# Patient Record
Sex: MALE | Race: BLACK OR AFRICAN AMERICAN | Employment: OTHER | URBAN - METROPOLITAN AREA
[De-identification: names, ages, dates, MRNs, and addresses within clinical notes are randomized per-mention and may not be internally consistent; named-entity substitution may affect disease eponyms.]

---

## 2023-02-04 ENCOUNTER — APPOINTMENT (OUTPATIENT)
Dept: GENERAL RADIOLOGY | Age: 73
End: 2023-02-04
Payer: MEDICARE

## 2023-02-04 ENCOUNTER — APPOINTMENT (OUTPATIENT)
Dept: CT IMAGING | Age: 73
End: 2023-02-04
Payer: MEDICARE

## 2023-02-04 ENCOUNTER — HOSPITAL ENCOUNTER (INPATIENT)
Age: 73
LOS: 5 days | Discharge: HOME | End: 2023-02-09
Attending: EMERGENCY MEDICINE | Admitting: PSYCHIATRY & NEUROLOGY
Payer: MEDICARE

## 2023-02-04 DIAGNOSIS — I65.21 ICAO (INTERNAL CAROTID ARTERY OCCLUSION), RIGHT: ICD-10-CM

## 2023-02-04 DIAGNOSIS — R53.1 LEFT-SIDED WEAKNESS: Primary | ICD-10-CM

## 2023-02-04 DIAGNOSIS — I63.50 CEREBROVASCULAR ACCIDENT (CVA) DUE TO STENOSIS OF CEREBRAL ARTERY (HCC): ICD-10-CM

## 2023-02-04 PROBLEM — I63.9 ACUTE ISCHEMIC STROKE (HCC): Status: ACTIVE | Noted: 2023-02-04

## 2023-02-04 LAB
ABSOLUTE EOS #: 0.07 K/UL (ref 0–0.44)
ABSOLUTE IMMATURE GRANULOCYTE: <0.03 K/UL (ref 0–0.3)
ABSOLUTE LYMPH #: 1.29 K/UL (ref 1.1–3.7)
ABSOLUTE MONO #: 0.55 K/UL (ref 0.1–1.2)
ANION GAP SERPL CALCULATED.3IONS-SCNC: 11 MMOL/L (ref 9–17)
ANION GAP: 11 MMOL/L (ref 7–16)
BASOPHILS # BLD: 0 % (ref 0–2)
BASOPHILS ABSOLUTE: <0.03 K/UL (ref 0–0.2)
BUN SERPL-MCNC: 19 MG/DL (ref 8–23)
CALCIUM SERPL-MCNC: 8.9 MG/DL (ref 8.6–10.4)
CHLORIDE SERPL-SCNC: 106 MMOL/L (ref 98–107)
CK SERPL-CCNC: 419 U/L (ref 39–308)
CO2 SERPL-SCNC: 22 MMOL/L (ref 20–31)
CREAT SERPL-MCNC: 1.07 MG/DL (ref 0.7–1.2)
EGFR, POC: >60 ML/MIN/1.73M2
EOSINOPHILS RELATIVE PERCENT: 1 % (ref 1–4)
GFR SERPL CREATININE-BSD FRML MDRD: >60 ML/MIN/1.73M2
GLUCOSE BLD-MCNC: 103 MG/DL (ref 74–100)
GLUCOSE SERPL-MCNC: 104 MG/DL (ref 70–99)
HCO3 VENOUS: 28.1 MMOL/L (ref 22–29)
HCT VFR BLD AUTO: 36.5 % (ref 40.7–50.3)
HGB BLD-MCNC: 12 G/DL (ref 13–17)
IMMATURE GRANULOCYTES: 0 %
INR PPP: 1
LYMPHOCYTES # BLD: 22 % (ref 24–43)
MAGNESIUM SERPL-MCNC: 1.9 MG/DL (ref 1.6–2.6)
MCH RBC QN AUTO: 32 PG (ref 25.2–33.5)
MCHC RBC AUTO-ENTMCNC: 32.9 G/DL (ref 28.4–34.8)
MCV RBC AUTO: 97.3 FL (ref 82.6–102.9)
MONOCYTES # BLD: 10 % (ref 3–12)
MYOGLOBIN SERPL-MCNC: 83 NG/ML (ref 28–72)
NRBC AUTOMATED: 0 PER 100 WBC
O2 SAT, VEN: 44 % (ref 60–85)
PARTIAL THROMBOPLASTIN TIME: 20.3 SEC (ref 20.5–30.5)
PCO2, VEN: 49 MM HG (ref 41–51)
PDW BLD-RTO: 11.6 % (ref 11.8–14.4)
PH VENOUS: 7.37 (ref 7.32–7.43)
PLATELET # BLD AUTO: 216 K/UL (ref 138–453)
PMV BLD AUTO: 10.7 FL (ref 8.1–13.5)
PO2, VEN: 25.8 MM HG (ref 30–50)
POC BUN: 23 MG/DL (ref 8–26)
POC CHLORIDE: 106 MMOL/L (ref 98–107)
POC CREATININE: 1.1 MG/DL (ref 0.51–1.19)
POC HEMATOCRIT: 39 % (ref 41–53)
POC HEMOGLOBIN: 13.2 G/DL (ref 13.5–17.5)
POC IONIZED CALCIUM: 1.23 MMOL/L (ref 1.15–1.33)
POC LACTIC ACID: 0.85 MMOL/L (ref 0.56–1.39)
POC POTASSIUM: 4.5 MMOL/L (ref 3.5–4.5)
POC SODIUM: 143 MMOL/L (ref 138–146)
POC TCO2: 27 MMOL/L (ref 22–30)
POSITIVE BASE EXCESS, VEN: 2 (ref 0–3)
POTASSIUM SERPL-SCNC: 4.6 MMOL/L (ref 3.7–5.3)
PROTHROMBIN TIME: 11.1 SEC (ref 9.1–12.3)
RBC # BLD: 3.75 M/UL (ref 4.21–5.77)
SEG NEUTROPHILS: 67 % (ref 36–65)
SEGMENTED NEUTROPHILS ABSOLUTE COUNT: 3.85 K/UL (ref 1.5–8.1)
SODIUM SERPL-SCNC: 139 MMOL/L (ref 135–144)
TROPONIN I SERPL DL<=0.01 NG/ML-MCNC: 11 NG/L (ref 0–22)
TROPONIN I SERPL DL<=0.01 NG/ML-MCNC: 11 NG/L (ref 0–22)
TROPONIN I SERPL DL<=0.01 NG/ML-MCNC: 14 NG/L (ref 0–22)
WBC # BLD AUTO: 5.8 K/UL (ref 3.5–11.3)

## 2023-02-04 PROCEDURE — 85025 COMPLETE CBC W/AUTO DIFF WBC: CPT

## 2023-02-04 PROCEDURE — 85610 PROTHROMBIN TIME: CPT

## 2023-02-04 PROCEDURE — 2580000003 HC RX 258: Performed by: FAMILY MEDICINE

## 2023-02-04 PROCEDURE — 6370000000 HC RX 637 (ALT 250 FOR IP): Performed by: STUDENT IN AN ORGANIZED HEALTH CARE EDUCATION/TRAINING PROGRAM

## 2023-02-04 PROCEDURE — 80048 BASIC METABOLIC PNL TOTAL CA: CPT

## 2023-02-04 PROCEDURE — 6370000000 HC RX 637 (ALT 250 FOR IP): Performed by: FAMILY MEDICINE

## 2023-02-04 PROCEDURE — 6360000004 HC RX CONTRAST MEDICATION: Performed by: STUDENT IN AN ORGANIZED HEALTH CARE EDUCATION/TRAINING PROGRAM

## 2023-02-04 PROCEDURE — 93005 ELECTROCARDIOGRAM TRACING: CPT | Performed by: STUDENT IN AN ORGANIZED HEALTH CARE EDUCATION/TRAINING PROGRAM

## 2023-02-04 PROCEDURE — 99223 1ST HOSP IP/OBS HIGH 75: CPT | Performed by: PSYCHIATRY & NEUROLOGY

## 2023-02-04 PROCEDURE — 82550 ASSAY OF CK (CPK): CPT

## 2023-02-04 PROCEDURE — 6360000002 HC RX W HCPCS

## 2023-02-04 PROCEDURE — 84520 ASSAY OF UREA NITROGEN: CPT

## 2023-02-04 PROCEDURE — 83605 ASSAY OF LACTIC ACID: CPT

## 2023-02-04 PROCEDURE — 85730 THROMBOPLASTIN TIME PARTIAL: CPT

## 2023-02-04 PROCEDURE — 82947 ASSAY GLUCOSE BLOOD QUANT: CPT

## 2023-02-04 PROCEDURE — 70450 CT HEAD/BRAIN W/O DYE: CPT

## 2023-02-04 PROCEDURE — 6360000002 HC RX W HCPCS: Performed by: FAMILY MEDICINE

## 2023-02-04 PROCEDURE — 71045 X-RAY EXAM CHEST 1 VIEW: CPT

## 2023-02-04 PROCEDURE — 82803 BLOOD GASES ANY COMBINATION: CPT

## 2023-02-04 PROCEDURE — 84484 ASSAY OF TROPONIN QUANT: CPT

## 2023-02-04 PROCEDURE — 80051 ELECTROLYTE PANEL: CPT

## 2023-02-04 PROCEDURE — 93005 ELECTROCARDIOGRAM TRACING: CPT | Performed by: PSYCHIATRY & NEUROLOGY

## 2023-02-04 PROCEDURE — 82553 CREATINE MB FRACTION: CPT

## 2023-02-04 PROCEDURE — 2580000003 HC RX 258: Performed by: PSYCHIATRY & NEUROLOGY

## 2023-02-04 PROCEDURE — 83874 ASSAY OF MYOGLOBIN: CPT

## 2023-02-04 PROCEDURE — 99285 EMERGENCY DEPT VISIT HI MDM: CPT

## 2023-02-04 PROCEDURE — 82565 ASSAY OF CREATININE: CPT

## 2023-02-04 PROCEDURE — 83735 ASSAY OF MAGNESIUM: CPT

## 2023-02-04 PROCEDURE — 85014 HEMATOCRIT: CPT

## 2023-02-04 PROCEDURE — 6360000002 HC RX W HCPCS: Performed by: STUDENT IN AN ORGANIZED HEALTH CARE EDUCATION/TRAINING PROGRAM

## 2023-02-04 PROCEDURE — 82330 ASSAY OF CALCIUM: CPT

## 2023-02-04 PROCEDURE — 6370000000 HC RX 637 (ALT 250 FOR IP): Performed by: PSYCHIATRY & NEUROLOGY

## 2023-02-04 PROCEDURE — 2580000003 HC RX 258: Performed by: STUDENT IN AN ORGANIZED HEALTH CARE EDUCATION/TRAINING PROGRAM

## 2023-02-04 PROCEDURE — 70496 CT ANGIOGRAPHY HEAD: CPT

## 2023-02-04 PROCEDURE — 2000000000 HC ICU R&B

## 2023-02-04 PROCEDURE — 36620 INSERTION CATHETER ARTERY: CPT

## 2023-02-04 RX ORDER — ONDANSETRON 2 MG/ML
4 INJECTION INTRAMUSCULAR; INTRAVENOUS EVERY 6 HOURS PRN
Status: DISCONTINUED | OUTPATIENT
Start: 2023-02-04 | End: 2023-02-09 | Stop reason: HOSPADM

## 2023-02-04 RX ORDER — HEPARIN SODIUM 1000 [USP'U]/ML
2000 INJECTION, SOLUTION INTRAVENOUS; SUBCUTANEOUS ONCE
Status: COMPLETED | OUTPATIENT
Start: 2023-02-04 | End: 2023-02-04

## 2023-02-04 RX ORDER — DOPAMINE HYDROCHLORIDE 160 MG/100ML
1-20 INJECTION, SOLUTION INTRAVENOUS CONTINUOUS
Status: DISCONTINUED | OUTPATIENT
Start: 2023-02-04 | End: 2023-02-06

## 2023-02-04 RX ORDER — POLYETHYLENE GLYCOL 3350 17 G/17G
17 POWDER, FOR SOLUTION ORAL DAILY PRN
Status: DISCONTINUED | OUTPATIENT
Start: 2023-02-04 | End: 2023-02-09 | Stop reason: HOSPADM

## 2023-02-04 RX ORDER — SODIUM CHLORIDE 9 MG/ML
INJECTION, SOLUTION INTRAVENOUS CONTINUOUS
Status: DISCONTINUED | OUTPATIENT
Start: 2023-02-04 | End: 2023-02-08

## 2023-02-04 RX ORDER — ASPIRIN 325 MG
650 TABLET ORAL ONCE
Status: COMPLETED | OUTPATIENT
Start: 2023-02-04 | End: 2023-02-04

## 2023-02-04 RX ORDER — DOPAMINE HYDROCHLORIDE 160 MG/100ML
INJECTION, SOLUTION INTRAVENOUS
Status: COMPLETED
Start: 2023-02-04 | End: 2023-02-04

## 2023-02-04 RX ORDER — SODIUM CHLORIDE 0.9 % (FLUSH) 0.9 %
5-40 SYRINGE (ML) INJECTION PRN
Status: DISCONTINUED | OUTPATIENT
Start: 2023-02-04 | End: 2023-02-09 | Stop reason: HOSPADM

## 2023-02-04 RX ORDER — HEPARIN SODIUM AND DEXTROSE 10000; 5 [USP'U]/100ML; G/100ML
5-30 INJECTION INTRAVENOUS CONTINUOUS
Status: DISCONTINUED | OUTPATIENT
Start: 2023-02-04 | End: 2023-02-04

## 2023-02-04 RX ORDER — ACETAMINOPHEN 650 MG/1
650 SUPPOSITORY RECTAL EVERY 4 HOURS PRN
Status: DISCONTINUED | OUTPATIENT
Start: 2023-02-04 | End: 2023-02-09 | Stop reason: HOSPADM

## 2023-02-04 RX ORDER — MIDODRINE HYDROCHLORIDE 5 MG/1
20 TABLET ORAL 3 TIMES DAILY
Status: DISCONTINUED | OUTPATIENT
Start: 2023-02-04 | End: 2023-02-04

## 2023-02-04 RX ORDER — DOPAMINE HYDROCHLORIDE 160 MG/100ML
1-20 INJECTION, SOLUTION INTRAVENOUS CONTINUOUS
Status: DISCONTINUED | OUTPATIENT
Start: 2023-02-04 | End: 2023-02-04

## 2023-02-04 RX ORDER — MIDODRINE HYDROCHLORIDE 5 MG/1
15 TABLET ORAL
Status: DISCONTINUED | OUTPATIENT
Start: 2023-02-04 | End: 2023-02-04

## 2023-02-04 RX ORDER — ATORVASTATIN CALCIUM 40 MG/1
40 TABLET, FILM COATED ORAL NIGHTLY
Status: DISCONTINUED | OUTPATIENT
Start: 2023-02-04 | End: 2023-02-05

## 2023-02-04 RX ORDER — ACETAMINOPHEN 325 MG/1
650 TABLET ORAL EVERY 4 HOURS PRN
Status: DISCONTINUED | OUTPATIENT
Start: 2023-02-04 | End: 2023-02-09 | Stop reason: HOSPADM

## 2023-02-04 RX ORDER — ONDANSETRON 2 MG/ML
4 INJECTION INTRAMUSCULAR; INTRAVENOUS ONCE
Status: DISCONTINUED | OUTPATIENT
Start: 2023-02-04 | End: 2023-02-04

## 2023-02-04 RX ORDER — MIDODRINE HYDROCHLORIDE 5 MG/1
20 TABLET ORAL ONCE
Status: COMPLETED | OUTPATIENT
Start: 2023-02-04 | End: 2023-02-04

## 2023-02-04 RX ORDER — SODIUM CHLORIDE 9 MG/ML
INJECTION, SOLUTION INTRAVENOUS PRN
Status: DISCONTINUED | OUTPATIENT
Start: 2023-02-04 | End: 2023-02-09 | Stop reason: HOSPADM

## 2023-02-04 RX ORDER — ASPIRIN 81 MG/1
81 TABLET, CHEWABLE ORAL DAILY
Status: DISCONTINUED | OUTPATIENT
Start: 2023-02-05 | End: 2023-02-09 | Stop reason: HOSPADM

## 2023-02-04 RX ORDER — SODIUM CHLORIDE 0.9 % (FLUSH) 0.9 %
10 SYRINGE (ML) INJECTION ONCE
Status: COMPLETED | OUTPATIENT
Start: 2023-02-04 | End: 2023-02-04

## 2023-02-04 RX ORDER — SODIUM CHLORIDE 0.9 % (FLUSH) 0.9 %
10 SYRINGE (ML) INJECTION ONCE
Status: DISCONTINUED | OUTPATIENT
Start: 2023-02-04 | End: 2023-02-04

## 2023-02-04 RX ORDER — ONDANSETRON 4 MG/1
4 TABLET, ORALLY DISINTEGRATING ORAL EVERY 8 HOURS PRN
Status: DISCONTINUED | OUTPATIENT
Start: 2023-02-04 | End: 2023-02-09 | Stop reason: HOSPADM

## 2023-02-04 RX ORDER — 0.9 % SODIUM CHLORIDE 0.9 %
1000 INTRAVENOUS SOLUTION INTRAVENOUS ONCE
Status: COMPLETED | OUTPATIENT
Start: 2023-02-04 | End: 2023-02-04

## 2023-02-04 RX ORDER — DEXTROSE MONOHYDRATE 25 G/50ML
12.5 INJECTION, SOLUTION INTRAVENOUS
Status: DISPENSED | OUTPATIENT
Start: 2023-02-04 | End: 2023-02-05

## 2023-02-04 RX ORDER — SODIUM CHLORIDE 0.9 % (FLUSH) 0.9 %
5-40 SYRINGE (ML) INJECTION EVERY 12 HOURS SCHEDULED
Status: DISCONTINUED | OUTPATIENT
Start: 2023-02-04 | End: 2023-02-04

## 2023-02-04 RX ADMIN — DOPAMINE HYDROCHLORIDE 5 MCG/KG/MIN: 160 INJECTION, SOLUTION INTRAVENOUS at 15:55

## 2023-02-04 RX ADMIN — SODIUM CHLORIDE: 9 INJECTION, SOLUTION INTRAVENOUS at 16:22

## 2023-02-04 RX ADMIN — TICAGRELOR 180 MG: 90 TABLET ORAL at 16:10

## 2023-02-04 RX ADMIN — MIDODRINE HYDROCHLORIDE 15 MG: 5 TABLET ORAL at 17:57

## 2023-02-04 RX ADMIN — IOPAMIDOL 90 ML: 755 INJECTION, SOLUTION INTRAVENOUS at 13:03

## 2023-02-04 RX ADMIN — MIDODRINE HYDROCHLORIDE 20 MG: 5 TABLET ORAL at 14:08

## 2023-02-04 RX ADMIN — HEPARIN SODIUM 2000 UNITS: 1000 INJECTION INTRAVENOUS; SUBCUTANEOUS at 16:14

## 2023-02-04 RX ADMIN — HEPARIN SODIUM 15 UNITS/KG/HR: 5000 INJECTION, SOLUTION INTRAVENOUS; SUBCUTANEOUS at 16:10

## 2023-02-04 RX ADMIN — SODIUM CHLORIDE 1000 ML: 9 INJECTION, SOLUTION INTRAVENOUS at 14:25

## 2023-02-04 RX ADMIN — ASPIRIN 650 MG: 325 TABLET ORAL at 15:23

## 2023-02-04 RX ADMIN — SODIUM CHLORIDE, PRESERVATIVE FREE 10 ML: 5 INJECTION INTRAVENOUS at 14:13

## 2023-02-04 RX ADMIN — MIDODRINE HYDROCHLORIDE 20 MG: 5 TABLET ORAL at 21:27

## 2023-02-04 RX ADMIN — DOPAMINE HYDROCHLORIDE IN DEXTROSE 7.5 MCG/KG/MIN: 1.6 INJECTION, SOLUTION INTRAVENOUS at 17:59

## 2023-02-04 RX ADMIN — SODIUM CHLORIDE 1000 ML: 9 INJECTION, SOLUTION INTRAVENOUS at 13:40

## 2023-02-04 ASSESSMENT — ENCOUNTER SYMPTOMS
ABDOMINAL PAIN: 0
BACK PAIN: 0
DIARRHEA: 0
CONSTIPATION: 0
RHINORRHEA: 0
VOMITING: 0
NAUSEA: 1
COUGH: 0
SHORTNESS OF BREATH: 0

## 2023-02-04 ASSESSMENT — PAIN - FUNCTIONAL ASSESSMENT: PAIN_FUNCTIONAL_ASSESSMENT: NONE - DENIES PAIN

## 2023-02-04 NOTE — PROGRESS NOTES
[facility] [address]     Emergency/Trauma Note    PATIENT NAME: Zandra Espinal    Shift date: 02/04/2023  Shift day: Saturday   Shift # 1    Room # 02/02     Name: Zandra Espinal            Age: 75 y.o. Gender: male          Sikh: None   Place of Scientology:     Trauma/Incident type: Stroke Alert  Admit Date & Time: 2/4/2023 12:57 PM  TRAUMA NAME: None    ADVANCE DIRECTIVES IN CHART? No    NAME OF DECISION MAKER: Unknown    RELATIONSHIP OF DECISION MAKER TO PATIENT:     PATIENT/EVENT DESCRIPTION:  Zandra Espinal is a 75 y.o. male who arrived via ground ambulance as stroke alert. Patient to be admitted to 02/02. SPIRITUAL ASSESSMENT-INTERVENTION-OUTCOME:  No spiritual assessment was carried out because patient was having a rough time. However, patient was open to prayer. Family was not present at the time. Per nurse, family knew patient was admitted to [address] [place] [address] Dr. Hauser.  offered support and prayed at patient's bedside. PATIENT BELONGINGS:  This  did not handle patient's belongings. ANY BELONGINGS OF SIGNIFICANT VALUE NOTED:  Unknown    REGISTRATION STAFF NOTIFIED? Yes    WHAT IS YOUR SPIRITUAL CARE PLAN FOR THIS PATIENT?:  Follow up visits recommended for more spiritual and emotional support. Electronically signed by Fr. Irais Carreon on 2/4/2023 at 2:11 PM.  Hermes Martinez  [phone]

## 2023-02-04 NOTE — ED PROVIDER NOTES
Problem: At Risk for Falls  Goal: # Patient does not fall  Outcome: Outcome Met, Continue evaluating goal progress toward completion     Problem: At Risk for Injury Due to Fall  Goal: # Patient does not fall  Outcome: Outcome Met, Continue evaluating goal progress toward completion     Problem: VTE, Risk for  Goal: # No s/s of VTE  Outcome: Outcome Met, Continue evaluating goal progress toward completion     Problem: Pressure Injury, Risk for  Goal: # Skin remains intact  Outcome: Outcome Met, Continue evaluating goal progress toward completion  Goal: No new pressure injury (PI) development  Outcome: Outcome Met, Continue evaluating goal progress toward completion      [facility] [place] [facility]     Emergency Department     Faculty Attestation    I performed a history and physical examination of the patient and discussed management with the resident. I reviewed the residents note and agree with the documented findings and plan of care. Any areas of disagreement are noted on the chart. I was personally present for the key portions of any procedures. I have documented in the chart those procedures where I was not present during the key portions. I have reviewed the emergency nurses triage note. I agree with the chief complaint, past medical history, past surgical history, allergies, medications, social and family history as documented unless otherwise noted below. For Physician Assistant/ Nurse Practitioner cases/documentation I have personally evaluated this patient and have completed at least one if not all key elements of the E/M (history, physical exam, and MDM). Additional findings are as noted. I have personally seen and evaluated the patient. I find the patient's history and physical exam are consistent with the NP/PA documentation. I agree with the care provided, treatment rendered, disposition and follow-up plan. Last known well approximately 2 hours prior to arrival the patient has sudden onset of left-sided symptoms which included his spasticity of his left upper extremity and difficulty with control of his left lower extremity various sensory deficits as well a stroke alert was called upon arrival.      Mary Higgins M.D.   Attending Emergency  Physician            Renetta Matt MD  02/04/23 2525

## 2023-02-04 NOTE — ED NOTES
Dr Crystal Cramer updated on IV access  Dr Crystal Cramer updated that pt has poor vascular        Ephriam Hesham RN  02/04/23 3378

## 2023-02-04 NOTE — H&P
HISTORY AND PHYSICAL EXAMINATION   Neuro Critical Care    Patient Name: Rosa Landa  Patient : 1950  Room/Bed: 0127/0127-01  Code Status: Full code  Allergies: No Known Allergies    CHIEF COMPLAINT:        Left-sided weakness. INTERVAL HISTORY    Initial Presentation (Admitted 2023):    59-year-old male with with past medical history of hypertension (on amlodipine) presented with complaint of left-sided numbness and weakness. As per patient, at around 11 AM he started feeling numb in the left upper extremity (going from shoulder down to the hand) along with weakness and having some loss of dexterity experienced while using his hand. He denies having any weakness in the left lower extremity but later on in the ED felt that left leg does not feel right. We called his sister and was brought to ER at [facility] [place], [place] with concern of stroke. On arrival to [facility] SVC's as BP was 138/68, . Patient had taken his morning amlodipine (unclear dosage) for hypertension. He denied using any antiplatelet/anticoagulation or statin. No history of smoking and drinks beer 2 times a week. On initial evaluation by stroke team, patient initially had NIH of 2 (left arm weakness and numbness). CT head without contrast: No acute abnormality. Remote left occipital infarct. CTA head and neck with contrast: Subtle linear hypodense focus in the proximal aspect of right ICA concerning for dissection versus right carotid terminus thrombus. Later on progressed to [place] of 8 (facial palsy, left arm, left leg, limb ataxia and sensory and dysarthria). Patient was given IV fluid 2 L and had improvement in exam again and NIH improved to 3. Due to pressure related changes favoring improved stenosis decided not to be obtained. And patient may benefit with stents with close ICU monitoring. An emergent thrombectomy for now.   Started on heparin, midodrine, aspirin, Brilinta and statin    On my eval, patient was awake alert and oriented x3 without any dysarthria or aphasia. No facial asymmetry noticed. No visual deficit noticed. Patient had numbness on the left side of the face left arm and left leg with difficulty appreciating sharp versus dull. Left upper extremity drift noticed. Left lower extremity 4/5 but no drift noticed. Patient and his sister was counseled about the management plan admission to the hospital in the ICU for close monitoring and further work-up.       CURRENT MEDICATIONS:  SCHEDULED MEDICATIONS:   ondansetron  4 mg IntraVENous Once    sodium chloride flush  5-40 mL IntraVENous 2 times per day    ticagrelor  180 mg Oral Once    heparin (porcine)  2,000 Units IntraVENous Once    [START ON 2023] aspirin  81 mg Oral Daily    [START ON 2023] ticagrelor  90 mg Oral BID     CONTINUOUS INFUSIONS:   sodium chloride      heparin 25,000 units in 0.9% sodium chloride 250 mL infusion      DOPamine      DOPamine      sodium chloride       PRN MEDICATIONS:   sodium chloride flush, sodium chloride, dextrose    VITALS:  Temperature Range: Temp: 96.8 °F (36 °C) Temp  Av.8 °F (36 °C)  Min: 96.8 °F (36 °C)  Max: 96.8 °F (36 °C)  BP Range: Systolic (28VVR), IHU:371 , Min:128 , JTE:081     Diastolic (40ONA), OJD:03, Min:67, Max:104    Pulse Range: Pulse  Av.9  Min: 52  Max: 77  Respiration Range: Resp  Av.4  Min: 15  Max: 24  Current Pulse Ox: SpO2: 97 %  24HR Pulse Ox Range: SpO2  Av.9 %  Min: 93 %  Max: 99 %  Patient Vitals for the past 12 hrs:   BP Temp Temp src Pulse Resp SpO2 Height Weight   23 1445 (!) 166/104 -- -- 66 15 97 % -- --   23 1418 -- -- -- 62 24 97 % -- --   23 1415 (!) 143/83 -- -- 77 17 96 % -- --   23 1412 138/74 -- -- -- -- 99 % -- --   23 1408 (!) 147/85 -- -- 62 20 98 % -- --   23 1404 135/78 -- -- 62 15 93 % -- --   23 1349 -- -- -- 72 21 96 % -- --   23 1333 128/74 -- -- 56 21 97 % -- --   23 1331 -- -- -- 57 15 -- -- --   02/04/23 1325 -- 96.8 °F (36 °C) Oral -- -- -- -- --   02/04/23 1320 -- -- -- 70 16 -- -- --   02/04/23 1317 128/67 -- -- 52 20 -- -- --   02/04/23 1307 138/68 -- Oral 56 18 97 % -- --   02/04/23 1306 -- -- -- -- -- -- 5' 7\" (1.702 m) 170 lb (77.1 kg)   02/04/23 1259 138/84 -- -- -- -- 99 % -- --     Estimated body mass index is 26.63 kg/m² as calculated from the following:    Height as of this encounter: 5' 7\" (1.702 m). Weight as of this encounter: 170 lb (77.1 kg).  []<16 Severe malnutrition  []16-16.99 Moderate malnutrition  []17-18.49 Mild malnutrition  []18.5-24.9 Normal  [x]25-29.9 Overweight (not obese)  []30-34.9 Obese class 1 (Low Risk)  []35-39.9 Obese class 2 (Moderate Risk)  []?40 Obese class 3 (High Risk)    RECENT LABS:   Lab Results   Component Value Date    WBC 5.8 02/04/2023    HGB 12.0 (L) 02/04/2023    HCT 36.5 (L) 02/04/2023     02/04/2023     02/04/2023    K 4.6 02/04/2023     02/04/2023    CREATININE 1.07 02/04/2023    BUN 19 02/04/2023    CO2 22 02/04/2023    INR 1.0 02/04/2023     24 HOUR INTAKE/OUTPUT:No intake or output data in the 24 hours ending 02/04/23 5088    IMAGING:      Radiology Review:     1.) CT brain without contrast:  Impression   No acute intracranial abnormality. Remote left occipital lobe infarct. Findings were discussed with Dr. Jeannie James At 1:28 pm on 2/4/2023.             2. ) CTA Head/Neck:  Impression   Subtle linear hypodense focus in the proximal aspect of the right internal   carotid artery with subsequent tapering of flow, from minimal to no flow, in   the mid and distal right cervical internal carotid artery concerning for a   dissection. Alternatively this may be the result of a right carotid terminus   thrombus. Angiography may be helpful in further characterization. No significant flow within the intracranial portion of the right internal   carotid artery.        No significant stenosis or occlusion within the remainder of the intracranial   vessels. Findings were discussed with Dr. Mari Bone At 1:37 pm on 2/4/2023. Labs and Images reviewed with:  [] Dr. Aniket Diaz. Margarita    [] Dr. Marylouise Meigs  [x] Dr. Mandi Waters  [] There are no new interval images to review. PHYSICAL EXAM       CONSTITUTIONAL:  Well developed, well nourished, alert and oriented x 3, in no acute distress. GCS 15. Nontoxic. No dysarthria. No aphasia. HEAD:  normocephalic, atraumatic    EYES:  PERRLA, EOMI.   ENT:  moist mucous membranes   NECK:  supple, symmetric   LUNGS:  Equal air entry bilaterally   CARDIOVASCULAR:  normal s1 / s2, RRR, distal pulses intact   ABDOMEN:  Soft, no rigidity   NEUROLOGIC:  Mental Status:  A & O x3,awake             Cranial Nerves:    cranial nerves II-XII are grossly intact    Motor Exam:    Drift:  present -left upper extremity  Tone:  normal    Motor exam is 5 out of 5 all extremities with the exception of left upper extremity 4 -/5 and left lower extremity 4/5    Sensory:    Touch:    Right Upper Extremity:  normal  Left Upper Extremity:  abnormal -difficulty differentiating sharp versus dull  Right Lower Extremity:  normal  Left Lower Extremity:  abnormal - difficulty differentiating sharp versus dull    Deep Tendon Reflexes:    Right Bicep:  1+  Left Bicep:  1+  Right Knee:  2+  Left Knee:  2+    Plantar Response:  Right:  downgoing  Left:  downgoing    Clonus:  absent  Traore's:  absent    Coordination/Dysmetria:  Heel to Shin:  Right:  normal  Left:  normal  Finger to Nose:   Right:  normal  Left:  abnormal -limited due to weakness  Dysdiadochokinesia: Not tested    Gait: Not tested   NIH Stroke Scale Total (if not done complete detailed one below):    1a.  Level of consciousness:  0 - alert; keenly responsive  1b. Level of consciousness questions:  0 - answers both questions correctly  1c. Level of consciousness questions:  0 - performs both tasks correctly  2. Best Gaze:  0 - normal  3.     Visual:  0 - no visual loss  4. Facial Palsy:  0 - normal symmetric movement  5a. Motor left arm:  1 - drift, limb holds 90 (or 45) degrees but drifts down before full 10 seconds: does not hit bed  5b. Motor right arm:  0 - no drift, limb holds 90 (or 45) degrees for full 10 seconds  6a. Motor left le - no drift; leg holds 30 degree position for full 5 seconds  6b. Motor right le - no drift; leg holds 30 degree position for full 5 seconds  7. Limb Ataxia:  0 - absent  8. Sensory:  1 - mild to moderate sensory loss; patient feels pinprick is less sharp or is dull on the affected side; there is a loss of superficial pain with pinprick but patient is aware of being touched   9. Best Language:  0 - no aphasia, normal  10. Dysarthria:  0 - normal  11. Extinction and Inattention:  0 - no abnormality  TOTAL: NIH 2    DRAINS:  [x] There are no drains for Neuro Critical Care to monitor at this time. ASSESSMENT AND PLAN:       21-year-old male with with past medical history of hypertension (on amlodipine) presented with complaint of left-sided numbness and weakness. CTA concerning for right ICA tapering concerning for dissection versus thrombus. Fluctuation in NIH exam with improvement after midodrine and fluids. Admission to neuro critical care unit for close neurological exam monitoring and blood pressure goals. Started on dual antiplatelet and statin. Left sided weakness and numbness  CTA concerning for Rt ICA thrombus and dissection. NEUROLOGIC:  - Imaging   CT head without contrast: No acute abnormality. Remote left occipital infarct. CTA head and neck with contrast: Subtle linear hypodense focus in the proximal aspect of right ICA concerning for dissection versus right carotid terminus thrombus. -MRI brain without contrast.  - Echo to review global study. -Given fluid bolus 2 L.  -Given midodrine 20 mg p.o. once followed by 50 mg 3 times daily.  -Consider dopamine gtt.  if SBP is persistently <160 despite midodrine and fluids.  -Heparin bolus 2000 units x 1 followed by heparin GTT 15u/kg  -Aspirin 650 mg x 1 followed by 81 mg daily.  -Brilinta 180 mg x 1 followed by 90 mg twice daily. -Atorvastatin 40 mg nightly  - Goal -180.  - Neuro checks per protocol    CARDIOVASCULAR:    BP Range: Systolic (94VKN), JEJ:077 , Min:128 , GPX:482     Diastolic (84XSU), RRA:83, Min:67, Max:104    Pulse Range: Pulse  Av.9  Min: 52  Max: 77    - Goal -180  - Continue telemetry    PULMONARY:    Respiration Range: Resp  Av.4  Min: 15  Max: 24  Current Pulse Ox: SpO2: 97 %    -On room air  - CXR: No acute cardiopulmonary process    RENAL/FLUID/ELECTROLYTE:    Lab Results   Component Value Date/Time     2023 01:22 PM    K 4.6 2023 01:22 PM     2023 01:22 PM    CO2 22 2023 01:22 PM    BUN 19 2023 01:22 PM    CREATININE 1.07 2023 01:22 PM    CREATININE 1.10 2023 01:02 PM    GLUCOSE 104 2023 01:22 PM    CALCIUM 8.9 2023 01:22 PM          No intake/output data recorded. No intake/output data recorded. - Urine output: Pending evaluation  - IVF: 100ml/hr  - Replace electrolytes PRN  - Daily BMP    GI/NUTRITION:  NUTRITION:  Diet NPO  - Bowel regimen: Not indicated  - GI prophylaxis: not indicated. Recent Labs     23  1322   WBC 5.8   HGB 12.0*   HCT 36.5*   MCV 97.3            ID:  Temperature Range: Temp: 96.8 °F (36 °C) Temp  Av.8 °F (36 °C)  Min: 96.8 °F (36 °C)  Max: 96.8 °F (36 °C)  - Tmax   - Continue to monitor for fevers  - Daily CBC    HEME:   - Daily CBC    ENDOCRINE:  - Continue to monitor blood glucose, goal <180. OTHER:  - PT/OT/ST   - Code Status: Full code. PROPHYLAXIS:  Stress ulcer: not indicated    DVT PROPHYLAXIS:  - SCD sleeves - Thigh High   - On heparin.     IV lines: 2  NG/PEG tube: none  Urinary cathter: none  Central Line: none    DISPOSITION:  [x] To remain ICU: Right ICA dissection vs thrombus. Fluctuating neuro exam. Watch if stenting required. [] OK for out of ICU from Neuro Critical Care standpoint    We will continue to follow along. For any changes in exam or patient status please contact Neuro Critical Care.       Elias Mclean MD  Neuro Critical Care  2/4/2023     3:53 PM

## 2023-02-04 NOTE — ED NOTES
Pt reported that he was in restroom today at 11 am and left arm and leg suddenly became numbness and \"I could not feel it\"  Pt sts he lives alone and called his sister, who called 911  Per EMS first team stated pt crawled to door but then was able to stand and walk to cot; left arm reported numbness with decreased sensation to upper left arm with involuntary movement   Pt arrived alert awake ox3 and cooperative with decreased sensation to left upper arm           Joshua Fish RN  02/04/23 7530

## 2023-02-04 NOTE — ED NOTES
Pt neuro status remains unchanged  Pt left upper arm with involuntary spondaic movements noted, however pt is able to grasp and squeeze writer with left hand appropriate when IV was being attempted on right hand-Neuro team aware of this.      Be De La Torre RN  02/04/23 5501

## 2023-02-04 NOTE — ED NOTES
[place] oh hold per verbal order Dr Kassidy Alva awaiting Midodrine to be delivered and given, IVF 1st liter infusing 999ml with 2nd liter to follow per Dr Chu Lazar verbal order      Tyler Dean, ANDREAS  02/04/23 1400

## 2023-02-04 NOTE — ED NOTES
Neruo Team at bedside  Unable to initiate heparin gtt-awaiting from Chula Skinner RN notified     Sunshine Gtz RN  02/04/23 9642

## 2023-02-04 NOTE — ED NOTES
Pt repositioned self up on cot to use urinal.  Pt sat self up on cot without assist.     Juliano Colon RN  02/04/23 2047

## 2023-02-04 NOTE — ED PROVIDER NOTES
[facility]  Emergency Department Encounter  Emergency Medicine Resident     Pt Name:Jay Jay Tatum  MRN: [id number]  [place] 1950  Date of evaluation: 2/4/23  PCP:  Aishwarya Roman MD  Note Started: 1:29 PM EST      CHIEF COMPLAINT       Chief Complaint   Patient presents with    Cerebrovascular Accident       HISTORY OF PRESENT ILLNESS  (Location/Symptom, Timing/Onset, Context/Setting, Quality, Duration, Modifying Factors, Severity.)      Katie Bergeron is a 75 y.o. male who presents with left-sided weakness and sensation changes. Last known well 11 AM.  Patient states he was standing in his bathroom when he realized he cannot move or feel his left arm or leg. He denies any chest pain or shortness of breath with this. He did not fall and injure himself. He denies any headache or vision changes. No abdominal pain but he does feel nauseous. Patient has never had symptoms like this before. He has a history of hypertension. Patient states he is unable to control his left side. PAST MEDICAL / SURGICAL / SOCIAL / FAMILY HISTORY      has no past medical history on file. has no past surgical history on file.       Social History     Socioeconomic History    Marital status: Unknown     Spouse name: Not on file    Number of children: Not on file    Years of education: Not on file    Highest education level: Not on file   Occupational History    Not on file   Tobacco Use    Smoking status: Not on file    Smokeless tobacco: Not on file   Substance and Sexual Activity    Alcohol use: Not on file    Drug use: Not on file    Sexual activity: Not on file   Other Topics Concern    Not on file   Social History Narrative    Not on file     Social Determinants of Health     Financial Resource Strain: Not on file   Food Insecurity: Not on file   Transportation Needs: Not on file   Physical Activity: Not on file   Stress: Not on file   Social Connections: Not on file   Intimate Partner Violence: Not on file   Housing Stability: Not on file       No family history on file. Allergies:  Patient has no known allergies. Home Medications:  Prior to Admission medications    Not on File         REVIEW OF SYSTEMS       Review of Systems   Constitutional:  Negative for chills and fever. HENT:  Negative for congestion and rhinorrhea. Eyes:  Negative for visual disturbance. Respiratory:  Negative for cough and shortness of breath. Cardiovascular:  Negative for chest pain. Gastrointestinal:  Positive for nausea. Negative for abdominal pain, constipation, diarrhea and vomiting. Genitourinary:  Negative for dysuria and frequency. Musculoskeletal:  Negative for back pain and neck pain. Skin:  Negative for rash. Neurological:  Positive for weakness and numbness. Negative for headaches. PHYSICAL EXAM      INITIAL VITALS:   /71   Pulse 95   Temp 98.2 °F (36.8 °C)   Resp 18   Ht 5' 7\" (1.702 m)   Wt 170 lb (77.1 kg)   SpO2 94%   BMI 26.63 kg/m²     Physical Exam  Constitutional:       General: He is not in acute distress. Appearance: Normal appearance. He is not ill-appearing, toxic-appearing or diaphoretic. HENT:      Head: Normocephalic and atraumatic. Mouth/Throat:      Mouth: Mucous membranes are moist.      Pharynx: Oropharynx is clear. Eyes:      Extraocular Movements: Extraocular movements intact. Cardiovascular:      Rate and Rhythm: Normal rate and regular rhythm. Heart sounds: Normal heart sounds. No murmur heard. Pulmonary:      Effort: Pulmonary effort is normal. No respiratory distress. Breath sounds: Normal breath sounds. No wheezing or rhonchi. Abdominal:      Palpations: Abdomen is soft. Tenderness: There is no abdominal tenderness. There is no guarding or rebound. Musculoskeletal:         General: Normal range of motion. Cervical back: Normal range of motion and neck supple. Right lower leg: No edema.       Left lower leg: No edema.   Skin:     General: Skin is warm and dry. Neurological:      Mental Status: He is alert and oriented to person, place, and time. Comments: On arrival, patient has left upper and lower extremity sensation deficit. He also has weakness on the left side but is able to move his left arm, appears to be out of his control and spastic with shaking activity intermittently. He has dysmetria on the left, poor coordination with heel-to-shin on the left. DDX/DIAGNOSTIC RESULTS / EMERGENCY DEPARTMENT COURSE / MDM     Medical Decision Making  50-year-old male presenting with left-sided sensation deficit and weakness. Stroke alert called on arrival patient brought to CT as soon as possible. Last known well 11 AM.  Vital stable, blood pressure normal.  Heart rate slightly bradycardic, normal rhythm. Initial exam notable for spastic movements that patient cannot control in the left upper extremity, he has sensation deficits in the left upper and lower extremity. Abnormal coordination. Stroke team present at bedside on arrival.  Discussion regarding TN K ongoing. Awaiting CT head and CTA of the head and neck results. Labs ordered in addition to EKG and chest x-ray. Amount and/or Complexity of Data Reviewed  Labs: ordered. Radiology: ordered. Decision-making details documented in ED Course. ECG/medicine tests: ordered. Risk  Prescription drug management. Decision regarding hospitalization. EKG      All EKG's are interpreted by the Emergency Department Physician who either signs or Co-signs this chart in the absence of a cardiologist.    EMERGENCY DEPARTMENT COURSE:      ED Course as of 02/04/23 2245   Sat Feb 04, 2023   1345 Stroke team initially ordered TNK. Decision changed to give 2L NS and 20mg midodrine for concerns of right carotid stenosis vs occlusion, and soft blood pressure.  Will re-eval after fluids and meds - if symptoms improved, may hold off on TNK [JS]   8695 CTA HEAD NECK W CONTRAST  IMPRESSION:  Subtle linear hypodense focus in the proximal aspect of the right internal  carotid artery with subsequent tapering of flow, from minimal to no flow, in  the mid and distal right cervical internal carotid artery concerning for a  dissection. Alternatively this may be the result of a right carotid terminus  thrombus. Angiography may be helpful in further characterization. No significant flow within the intracranial portion of the right internal  carotid artery. No significant stenosis or occlusion within the remainder of the intracranial  vessels. [JS]   6858 CT HEAD WO CONTRAST  IMPRESSION:  No acute intracranial abnormality. Remote left occipital lobe infarct. [JS]      ED Course User Index  [JS] Karen Burrows DO     Patient to be admitted to neuro ICU for further management. TNKase was not given here. He remained stable with somewhat improved exam, more control of his left side with increased blood pressures. Neuro endovascular following for possible intervention regarding right-sided carotid stenosis versus dissection. PROCEDURES:      CONSULTS:  IP CONSULT TO PHARMACY  PHARMACY TO CHANGE BASE FLUIDS  IP CONSULT TO NEUROCRITICAL CARE  IP CONSULT TO IV TEAM  IP CONSULT TO PHYSICAL MEDICINE REHAB  IP CONSULT TO IV TEAM      FINAL IMPRESSION      1. Left-sided weakness          DISPOSITION / PLAN     DISPOSITION Admitted 02/04/2023 03:00:46 PM      PATIENT REFERRED TO:  No follow-up provider specified. DISCHARGE MEDICATIONS:  There are no discharge medications for this patient.       Karen Burrows DO  Emergency Medicine Resident    (Please note that portions of thisnote were completed with a voice recognition program.  Efforts were made to edit the dictations but occasionally words are mis-transcribed.)       Karen Burrows DO  Resident  02/04/23 3996

## 2023-02-04 NOTE — ED NOTES
CT completed  Neuro team at bedside     [facility]  02/04/23 9179

## 2023-02-04 NOTE — ED NOTES
Report called  Awaiting for admit order to drop and transfer pt  Report to [id number] [address], ANDREAS  02/04/23 5986

## 2023-02-04 NOTE — CARE COORDINATION
Case Management Assessment  Initial Evaluation    Date/Time of Evaluation: 2/4/2023 5:00 PM  Assessment Completed by: Andrea Agarwal RN    If patient is discharged prior to next notation, then this note serves as note for discharge by case management. Patient Name: Sanam Babcock                   YOB: 1950  Diagnosis: Acute ischemic stroke [facility]) [I63.9]                   Date / Time: 2/4/2023 12:57 PM    Patient Admission Status: Inpatient   Readmission Risk (Low < 19, Mod (19-27), High > 27): Readmission Risk Score: 9.1    Current PCP: Robin Claire MD  PCP verified by CM? (P) Yes Robin Claire MD)    Chart Reviewed: Yes      History Provided by: Patient  Patient Orientation: Alert and Oriented, Person, Place, Situation, Self    Patient Cognition: Alert    Hospitalization in the last 30 days (Readmission):  No    If yes, Readmission Assessment in CM Navigator will be completed.     Advance Directives:      Code Status: Full Code   Patient's Primary Decision Maker is: Legal Next of Kin      Discharge Planning:    Patient lives with: (P) Alone Type of Home: (P) House  Primary Care Giver: Self  Patient Support Systems include: Family Members   Current Financial resources: (P) Medicare  Current community resources: (P) None  Current services prior to admission: (P) None            Current DME:              Type of Home Care services:  (P) None    ADLS  Prior functional level: (P) Independent in ADLs/IADLs  Current functional level: (P) Independent in ADLs/IADLs    PT AM-PAC:   /24  OT AM-PAC:   /24    Family can provide assistance at DC: (P) Yes (Sister can help if needed)  Would you like Case Management to discuss the discharge plan with any other family members/significant others, and if so, who? (P) No  Plans to Return to Present Housing: (P) Yes  Other Identified Issues/Barriers to RETURNING to current housing: none  Potential Assistance needed at discharge: (P) N/A            Potential DME:  none  Patient expects to discharge to: (P) House  Plan for transportation at discharge: (P) Family (Sister will transport)    Financial    Payor: MEDICARE / Plan: MEDICARE PART A AND B / Product Type: *No Product type* /     Does insurance require precert for SNF: No    Potential assistance Purchasing Medications: (P) No  Meds-to-Beds request:      No Pharmacies Listed    Notes:    Factors facilitating achievement of predicted outcomes: Family support, Cooperative, and Pleasant    Barriers to discharge: Medical complications    Additional Case Management Notes: Transitional planning: Plans to return home independently. Has ride. Sister will transport. Address, emergency contact, PCP and insurance confirmed with patient. Patient denies any needs at the current time. Will continue to follow for any needs. The Plan for Transition of Care is related to the following treatment goals of Acute ischemic stroke ([facility] [address] [D84.1]    IF APPLICABLE: The Patient and/or patient representative Silvino Dacosta and his family were provided with a choice of provider and agrees with the discharge plan. Freedom of choice list with basic dialogue that supports the patient's individualized plan of care/goals and shares the quality data associated with the providers was provided to: (P) Patient   Patient Representative Name:       The Patient and/or Patient Representative Agree with the Discharge Plan?  (P) Yes    Wes Figueredo RN  Case Management Department  Ph: [phone] Fax: [phone]

## 2023-02-04 NOTE — CONSULTS
Endovascular Neurosurgery Consult      Reason for evaluation: R ICA occlusion     SUBJECTIVE:   History of Chief Complaint:      75year old man with HTN, was taking a shower at 11am. Suddenly c/o left side weakness and numbness. In the ED patient felt that he couldn't have good control of his left arm. CTA done showed right ICA occlusion, dissection vs thrombus. In the ED, NS 2L bolus was given, and midodrine 20mg was also given, his BP climbed up to 140s and his symptoms improved from NIHSS of 10 to 4. Allergies  has No Known Allergies. Medications  Prior to Admission medications    Not on File    Scheduled Meds:   ondansetron  4 mg IntraVENous Once    sodium chloride flush  5-40 mL IntraVENous 2 times per day    sodium chloride  1,000 mL IntraVENous Once    ticagrelor  180 mg Oral Once    heparin (porcine)  2,000 Units IntraVENous Once    [START ON 2/5/2023] aspirin  81 mg Oral Daily    [START ON 2/5/2023] ticagrelor  90 mg Oral BID     Continuous Infusions:   sodium chloride      heparin 25,000 units in 0.9% sodium chloride 250 mL infusion       PRN Meds:.sodium chloride flush, sodium chloride, dextrose  Past Medical History   has no past medical history on file. Past Surgical History   has no past surgical history on file. Social History   has no history on file for tobacco use.   has no history on file for alcohol use.   has no history on file for drug use. Family History  family history is not on file.     Review of Systems:  CONSTITUTIONAL:  negative for fevers, chills, fatigue and malaise    EYES:  negative for double vision, blurred vision and photophobia     HEENT:  negative for tinnitus, epistaxis and sore throat    RESPIRATORY:  negative for cough, shortness of breath, wheezing    CARDIOVASCULAR:  negative for chest pain, palpitations, syncope, edema    GASTROINTESTINAL:  negative for nausea, vomiting    GENITOURINARY:  negative for incontinence    MUSCULOSKELETAL:  negative for neck or back pain    NEUROLOGICAL:  Negative for weakness and tingling  negative for headaches and dizziness    PSYCHIATRIC:  negative for anxiety      Review of systems otherwise negative. OBJECTIVE:     Vitals:    23 1445   BP: (!) 166/104   Pulse: 66   Resp: 15   Temp:    SpO2: 97%        General:  Gen: normal habitus, NAD  HEENT: NCAT, mucosa moist  Cvs: RRR, S1 S2 normal  Resp: symmetric unlabored breathing  Abd: s/nd/nt  Ext: no edema  Skin: no lesions seen, warm and dry    Neuro:  Gen: awake and alert, oriented x3. Lang/speech: no aphasia, +dysarthria. Follows commands. CN: PERRL, EOMI, VFF, V1-3 intact, left face droop, hearing intact, shoulder shrug symmetric, tongue midline  Motor: grossly 5/5 UE and LE on the right, left arm and leg mild drift  Sense:normal on the right, no sensation on the left  Coord: left arm and leg mild ataxia  DTR: deferred  Gait: narrow base gait    NIH Stroke Scale:   1a  Level of consciousness: 0 - alert; keenly responsive   1b. LOC questions:  0 - answers both questions correctly   1c. LOC commands: 0 - performs both tasks correctly   2. Best Gaze: 0 - normal   3. Visual: 0 - no visual loss   4. Facial Palsy: 1 - minor paralysis (flattened nasolabial fold, asymmetric on smiling)   5a. Motor left arm: 2 - some effort against gravity, limb cannot get to or maintain (if cued) 90 (or 45) degrees, drifts down to bed, but has some effort against gravity    5b. Motor right arm: 0 - no drift, limb holds 90 (or 45) degrees for full 10 seconds   6a. Motor left le - some effort against gravity; leg falls to bed by 5 seconds but has some effort against gravity   6b  Motor right le - no drift; leg holds 30 degree position for full 5 seconds   7. Limb Ataxia: 2 - present in two limbs   8. Sensory: 2 - severe to total sensory loss; patient is not aware of being touched in face, arm, leg   9. Best Language:  0 - no aphasia, normal   10.  Dysarthria: 1 - mild to moderate, patient slurs at least some words and at worst, can be understood with some difficulty   11. Extinction and Inattention: 0 - no abnormality         Total:   10     MRS: 3      LABS:   Reviewed. Lab Results   Component Value Date    HGB 12.0 (L) 02/04/2023    WBC 5.8 02/04/2023     02/04/2023     02/04/2023    BUN 19 02/04/2023    CREATININE 1.07 02/04/2023    APTT 20.3 (L) 02/04/2023    INR 1.0 02/04/2023      No results found for: COVID19    RADIOLOGY:   Images were personally reviewed including:    CTA head neck 2/4/23        ASSESSMENT:     75year old man with HTN, p/w acute onset of left hemiparesis, and found to have R ICA occlusion. PLAN:     - patient symptoms improved after raising the BP from 120s to the 140s with 2L NS bolus and midodrine 20mg, NIHSS improved from 10 to 4  - it was decided not to give TNK as he improved and given the R ICA occlusion which requires stenting  - instead it is opted to have patient loaded with aspirin 650mg x 1 dose, brillinta 180mg x 1 dose, follow by ASA 81mg daily and brillinta 90mg BID  - bolus heparin 2000 units and start heparin gtt 15 units/kg/hr, goal PT 50-70  - admit to neuroICU  - if pt worsen, will take patient to IR immediate  - if patient remains stable or improve, will plan for intervention on Monday      Case discussed with Dr. Darwin Perkins attending.     Kassie Lomeli MD  Stroke, [facility]  [id number]  Electronically signed 2/4/2023 at 3:26 PM

## 2023-02-04 NOTE — CONSULTS
Department of Endovascular Neurosurgery  Resident Consult Note  Stroke Alert paged @ 12:48 PM (ETA 5 min)  ER Room # 2  Arrival to patient bedside @ 1:00 PM        Reason for Consult:  stroke alert  Requesting Physician:  ED  Endovascular Neurosurgeon:   []Dr. Nehemiah Bean  [x]Dr. Lord Cao  []Dr. Tay Ryan   []    History Obtained From:  patient    CHIEF COMPLAINT:       Left sided weakness    HISTORY OF PRESENT ILLNESS:       The patient is a 75 y.o. male with hx of HTN who presents from home with concern of left-sided weakness (upper>lower) and left-sided numbness. Patient states he cannot control his left arm. Denies any headache, vision changes. Last know well: 2/4/2023 @ 11AM    On presentation:  BP: 138/68  BSL: 103    Prior to arrival patient was on  Antiplatelets/anticoagulants: no  Statins: no    Smoking history: non-smoker       PAST MEDICAL HISTORY :       Past Medical History:    No past medical history on file. Past Surgical History:    No past surgical history on file. Social History:   Social History     Socioeconomic History    Marital status: Not on file     Spouse name: Not on file    Number of children: Not on file    Years of education: Not on file    Highest education level: Not on file   Occupational History    Not on file   Tobacco Use    Smoking status: Not on file    Smokeless tobacco: Not on file   Substance and Sexual Activity    Alcohol use: Not on file    Drug use: Not on file    Sexual activity: Not on file   Other Topics Concern    Not on file   Social History Narrative    Not on file     Social Determinants of Health     Financial Resource Strain: Not on file   Food Insecurity: Not on file   Transportation Needs: Not on file   Physical Activity: Not on file   Stress: Not on file   Social Connections: Not on file   Intimate Partner Violence: Not on file   Housing Stability: Not on file       Family History:   No family history on file. Allergies:  Patient has no known allergies.     Home Medications:  Prior to Admission medications    Not on File       Current Medications:   Current Facility-Administered Medications: ondansetron (ZOFRAN) injection 4 mg, 4 mg, IntraVENous, Once    REVIEW OF SYSTEMS:       CONSTITUTIONAL: negative for fatigue and malaise   EYES: negative for double vision and photophobia    HEENT: negative for tinnitus and sore throat   RESPIRATORY: negative for cough, shortness of breath   CARDIOVASCULAR: negative for chest pain, palpitations   GASTROINTESTINAL: negative for nausea, vomiting   GENITOURINARY: negative for incontinence   MUSCULOSKELETAL: negative for neck or back pain   NEUROLOGICAL: Positive for left-sided weakness. negative for seizures   PSYCHIATRIC: negative for fatigue     Review of systems otherwise negative. PHYSICAL EXAM:       Ht 5' 7\" (1.702 m)   Wt 170 lb (77.1 kg)   BMI 26.63 kg/m²     CONSTITUTIONAL:  Well developed, well nourished, alert and oriented x 3, in no acute distress. GCS 15, nontoxic. No dysarthria, no aphasia.    HEAD:  normocephalic, atraumatic    EYES:  PERRLA, EOMI.   ENT:  moist mucous membranes   NECK:  supple, symmetric, no midline tenderness to palpation    BACK:  without midline tenderness, step-offs or deformities    LUNGS:  Equal air entry bilaterally   CARDIOVASCULAR:  normal s1 / s2   ABDOMEN:  Soft, no rigidity   NEUROLOGIC:  Mental Status:  A & O x3,awake             Cranial Nerves:    cranial nerves II-XII are grossly intact    Motor Exam:    Drift:  present - LUE/LLE  Tone:  normal    Motor exam is symmetrical 5 out of 5 all extremities bilaterally    Sensory:    Touch:    Right Upper Extremity:  normal  Left Upper Extremity:  abnormal - decreased  Right Lower Extremity:  normal  Left Lower Extremity:  abnormal - decreased    Deep Tendon Reflexes:    Right Bicep:  2+  Left Bicep:  2+  Right Knee:  2+  Left Knee:  2+    Plantar Response:  Right:  downgoing  Left:  downgoing    Clonus:  N/A  Traore's: N/A    Coordination/Dysmetria:  Heel to Shin:  Right:  normal  Left:  abnormal  Finger to Nose:   Right:  normal  Left:  abnormal   Dysdiadochokinesia:  N/A    Gait:  Not tested due to condition    INITIAL NIH STROKE SCALE:    Time Performed:  100 PM     1a. Level of consciousness:  0 - alert; keenly responsive  1b. Level of consciousness questions:  0 - answers both questions correctly  1c. Level of consciousness questions:  0 - performs both tasks correctly  2. Best Gaze:  0 - normal  3. Visual:  0 - no visual loss  4. Facial Palsy:  1 - minor paralysis (flattened nasolabial fold, asymmetric on smiling)  5a. Motor left arm:  2 - some effort against gravity, limb cannot get to or maintain (if cued) 90 (or 45) degrees, drifts down to bed, but has some effort against gravity   5b. Motor right arm:  0 - no drift, limb holds 90 (or 45) degrees for full 10 seconds  6a. Motor left le - drift; leg falls by the end of the 5 second period but does not hit bed  6b. Motor right le - no drift; leg holds 30 degree position for full 5 seconds  7. Limb Ataxia:  2 - present in two limbs  8. Sensory:  1 - mild to moderate sensory loss; patient feels pinprick is less sharp or is dull on the affected side; there is a loss of superficial pain with pinprick but patient is aware of being touched   9. Best Language:  0 - no aphasia, normal  10. Dysarthria:  1 - mild to moderate, patient slurs at least some words and at worst, can be understood with some difficulty  11.   Extinction and Inattention:  0 - no abnormality    TOTAL:  8     SKIN:  no rash      Modified Rufina Score Scale:     [] Zero: No symptoms at all   [] 1: No significant disability despite symptoms; able to carry out all usual duties and activities   [x] 2: Slight disability; unable to carry out all previous activities, but able to look after own affairs without assistance   [] 3:Moderate disability; requiring some help, but able to walk without assistance   [] 4: Moderately severe disability; unable to walk and attend to bodily needs without assistance   [] 5:Severe disability; bedridden, incontinent and requiring constant nursing care and attention    LABS AND IMAGING:     CBC with Differential:  No results found for: WBC, RBC, HGB, HCT, PLT, MCV, MCH, MCHC, RDW, NRBC, SEGSPCT, BANDSPCT, BLASTSPCT, METASPCT, LYMPHOPCT, PROMYELOPCT, MONOPCT, MYELOPCT, EOSPCT, BASOPCT, MONOSABS, LYMPHSABS, EOSABS, BASOSABS, DIFFTYPE      BMP:    Lab Results   Component Value Date/Time    CREATININE 1.10 02/04/2023 01:02 PM       Radiology Review:    1.) CT brain without contrast:  Impression   No acute intracranial abnormality. Remote left occipital lobe infarct. Findings were discussed with Dr. Tay Ryan At 1:28 pm on 2/4/2023.           2.) CTA Head/Neck:  Impression   Subtle linear hypodense focus in the proximal aspect of the right internal   carotid artery with subsequent tapering of flow, from minimal to no flow, in   the mid and distal right cervical internal carotid artery concerning for a   dissection. Alternatively this may be the result of a right carotid terminus   thrombus. Angiography may be helpful in further characterization. No significant flow within the intracranial portion of the right internal   carotid artery. No significant stenosis or occlusion within the remainder of the intracranial   vessels. Findings were discussed with Dr. Tay Ryan At 1:37 pm on 2/4/2023.     3.) Brain MRI W/O:       ASSESSMENT AND PLAN:     There is no problem list on file for this patient. Assessment                 75 y.o. male with hx of HTN who presents from home with concern of left-sided weakness (upper>lower) and left-sided numbness. Consideration of dissection vs acute thrombus vs occlusion (acute vs chronic)    Last Known Well (date and time): 2/4/2023 @ 11AM    2.  Candidate for IV Tenecteplase therapy     Yes [x] However with improvement post increasing perfusion, favoring acute stenosis that may benefit from a stent. Will monitor in close neuroICU setting    No  [] due to the following exclusion criteria:     3. Candidate for Thrombectomy    Yes []      No [x] due to the following exclusion criteria: no LVO    - Discussed with Dr. Isai Diggs     Recommendations:    [] General Neurology Care Status - prefer 5th floor (5A/5C)   [] Internal Medicine General Care Status   [x] NICU Status - (5B)     [] MICU Status   [] Observation Status    Imaging   - CT Head  WO : done   - CTA Head and Neck : done   - MRI Brain WO :  - ECHO    Medications   - Given fluid bolus 2 L  - Midodrine 20mg PO x 1, followed by 15mg TID  - Heparin bolus 2000u x 1, followed by heparin gtt 15u/kg  - Dopamine gtt if SBP is persistently <160 despite midodrine and fluids.  - Aspirin 650 mg x 1, followed by 81 mg  - Brilinta 180 mg x 1, followed by 90mg twice daily   - Atorvastatin 40 mg nightly     Labs  - Fasting Lipid panel  - HgbA1c lab      - PT, OT, Speech eval   - Hydrate with 2000cc bolus then IVF NS @ 100cc/hr   - Telemetry   - Neuro checks per protocol  - We recommend -180  - Blood glucose goal less than 180  - Please avoid dextrose containing solutions        Additional recommendations may follow    Please contact [facility] with any changes in patients neurologic status. Thank you for your consult.        Aniyah Lindsay MD   PGY 4 Neurology Resident  2/4/2023 at 1:12 PM

## 2023-02-05 ENCOUNTER — APPOINTMENT (OUTPATIENT)
Dept: MRI IMAGING | Age: 73
End: 2023-02-05
Payer: MEDICARE

## 2023-02-05 LAB
ABSOLUTE EOS #: <0.03 K/UL (ref 0–0.44)
ABSOLUTE IMMATURE GRANULOCYTE: 0.05 K/UL (ref 0–0.3)
ABSOLUTE LYMPH #: 0.85 K/UL (ref 1.1–3.7)
ABSOLUTE MONO #: 0.75 K/UL (ref 0.1–1.2)
ANION GAP SERPL CALCULATED.3IONS-SCNC: 11 MMOL/L (ref 9–17)
BASOPHILS # BLD: 0 % (ref 0–2)
BASOPHILS ABSOLUTE: <0.03 K/UL (ref 0–0.2)
BUN SERPL-MCNC: 10 MG/DL (ref 8–23)
CALCIUM SERPL-MCNC: 9 MG/DL (ref 8.6–10.4)
CHLORIDE SERPL-SCNC: 105 MMOL/L (ref 98–107)
CHOLEST SERPL-MCNC: 217 MG/DL
CHOLESTEROL/HDL RATIO: 4.3
CO2 SERPL-SCNC: 19 MMOL/L (ref 20–31)
CREAT SERPL-MCNC: 0.8 MG/DL (ref 0.7–1.2)
EOSINOPHILS RELATIVE PERCENT: 0 % (ref 1–4)
EST. AVERAGE GLUCOSE BLD GHB EST-MCNC: 91 MG/DL
GFR SERPL CREATININE-BSD FRML MDRD: >60 ML/MIN/1.73M2
GLUCOSE SERPL-MCNC: 168 MG/DL (ref 70–99)
HBA1C MFR BLD: 4.8 % (ref 4–6)
HCT VFR BLD AUTO: 39.8 % (ref 40.7–50.3)
HCT VFR BLD AUTO: 40 % (ref 40.7–50.3)
HDLC SERPL-MCNC: 51 MG/DL
HGB BLD-MCNC: 13.6 G/DL (ref 13–17)
HGB BLD-MCNC: 13.7 G/DL (ref 13–17)
IMMATURE GRANULOCYTES: 1 %
LDLC SERPL CALC-MCNC: 146 MG/DL (ref 0–130)
LYMPHOCYTES # BLD: 9 % (ref 24–43)
MCH RBC QN AUTO: 31.4 PG (ref 25.2–33.5)
MCH RBC QN AUTO: 31.6 PG (ref 25.2–33.5)
MCHC RBC AUTO-ENTMCNC: 34.2 G/DL (ref 28.4–34.8)
MCHC RBC AUTO-ENTMCNC: 34.3 G/DL (ref 28.4–34.8)
MCV RBC AUTO: 91.5 FL (ref 82.6–102.9)
MCV RBC AUTO: 92.6 FL (ref 82.6–102.9)
MONOCYTES # BLD: 8 % (ref 3–12)
NRBC AUTOMATED: 0 PER 100 WBC
NRBC AUTOMATED: 0 PER 100 WBC
PARTIAL THROMBOPLASTIN TIME: 32.4 SEC (ref 20.5–30.5)
PARTIAL THROMBOPLASTIN TIME: 41.5 SEC (ref 20.5–30.5)
PARTIAL THROMBOPLASTIN TIME: 50.9 SEC (ref 20.5–30.5)
PARTIAL THROMBOPLASTIN TIME: 52 SEC (ref 20.5–30.5)
PDW BLD-RTO: 11.2 % (ref 11.8–14.4)
PDW BLD-RTO: 11.3 % (ref 11.8–14.4)
PLATELET # BLD AUTO: 226 K/UL (ref 138–453)
PLATELET # BLD AUTO: 235 K/UL (ref 138–453)
PMV BLD AUTO: 9.6 FL (ref 8.1–13.5)
PMV BLD AUTO: 9.6 FL (ref 8.1–13.5)
POTASSIUM SERPL-SCNC: 3.7 MMOL/L (ref 3.7–5.3)
RBC # BLD: 4.3 M/UL (ref 4.21–5.77)
RBC # BLD: 4.37 M/UL (ref 4.21–5.77)
SEG NEUTROPHILS: 82 % (ref 36–65)
SEGMENTED NEUTROPHILS ABSOLUTE COUNT: 7.97 K/UL (ref 1.5–8.1)
SODIUM SERPL-SCNC: 135 MMOL/L (ref 135–144)
TRIGL SERPL-MCNC: 99 MG/DL
TROPONIN I SERPL DL<=0.01 NG/ML-MCNC: 20 NG/L (ref 0–22)
WBC # BLD AUTO: 10 K/UL (ref 3.5–11.3)
WBC # BLD AUTO: 9.7 K/UL (ref 3.5–11.3)

## 2023-02-05 PROCEDURE — 70551 MRI BRAIN STEM W/O DYE: CPT

## 2023-02-05 PROCEDURE — 84484 ASSAY OF TROPONIN QUANT: CPT

## 2023-02-05 PROCEDURE — 2700000000 HC OXYGEN THERAPY PER DAY

## 2023-02-05 PROCEDURE — 6370000000 HC RX 637 (ALT 250 FOR IP): Performed by: STUDENT IN AN ORGANIZED HEALTH CARE EDUCATION/TRAINING PROGRAM

## 2023-02-05 PROCEDURE — 2580000003 HC RX 258: Performed by: FAMILY MEDICINE

## 2023-02-05 PROCEDURE — 36415 COLL VENOUS BLD VENIPUNCTURE: CPT

## 2023-02-05 PROCEDURE — 85025 COMPLETE CBC W/AUTO DIFF WBC: CPT

## 2023-02-05 PROCEDURE — 6360000002 HC RX W HCPCS: Performed by: STUDENT IN AN ORGANIZED HEALTH CARE EDUCATION/TRAINING PROGRAM

## 2023-02-05 PROCEDURE — 80048 BASIC METABOLIC PNL TOTAL CA: CPT

## 2023-02-05 PROCEDURE — 92523 SPEECH SOUND LANG COMPREHEN: CPT

## 2023-02-05 PROCEDURE — 2000000000 HC ICU R&B

## 2023-02-05 PROCEDURE — 6360000002 HC RX W HCPCS: Performed by: FAMILY MEDICINE

## 2023-02-05 PROCEDURE — 85730 THROMBOPLASTIN TIME PARTIAL: CPT

## 2023-02-05 PROCEDURE — 85027 COMPLETE CBC AUTOMATED: CPT

## 2023-02-05 PROCEDURE — 83036 HEMOGLOBIN GLYCOSYLATED A1C: CPT

## 2023-02-05 PROCEDURE — 80061 LIPID PANEL: CPT

## 2023-02-05 PROCEDURE — 99233 SBSQ HOSP IP/OBS HIGH 50: CPT | Performed by: PSYCHIATRY & NEUROLOGY

## 2023-02-05 PROCEDURE — 94761 N-INVAS EAR/PLS OXIMETRY MLT: CPT

## 2023-02-05 RX ORDER — ATORVASTATIN CALCIUM 80 MG/1
80 TABLET, FILM COATED ORAL NIGHTLY
Status: DISCONTINUED | OUTPATIENT
Start: 2023-02-05 | End: 2023-02-09 | Stop reason: HOSPADM

## 2023-02-05 RX ORDER — LORAZEPAM 2 MG/ML
1 INJECTION INTRAMUSCULAR
Status: COMPLETED | OUTPATIENT
Start: 2023-02-05 | End: 2023-02-05

## 2023-02-05 RX ADMIN — ACETAMINOPHEN 650 MG: 325 TABLET ORAL at 05:08

## 2023-02-05 RX ADMIN — DOPAMINE HYDROCHLORIDE IN DEXTROSE 20 MCG/KG/MIN: 1.6 INJECTION, SOLUTION INTRAVENOUS at 14:48

## 2023-02-05 RX ADMIN — TICAGRELOR 90 MG: 90 TABLET ORAL at 20:38

## 2023-02-05 RX ADMIN — TICAGRELOR 90 MG: 90 TABLET ORAL at 07:49

## 2023-02-05 RX ADMIN — ATORVASTATIN CALCIUM 80 MG: 80 TABLET, FILM COATED ORAL at 20:38

## 2023-02-05 RX ADMIN — ASPIRIN 81 MG: 81 TABLET, CHEWABLE ORAL at 07:49

## 2023-02-05 RX ADMIN — HEPARIN SODIUM 19 UNITS/KG/HR: 5000 INJECTION, SOLUTION INTRAVENOUS; SUBCUTANEOUS at 12:58

## 2023-02-05 RX ADMIN — LORAZEPAM 1 MG: 2 INJECTION INTRAMUSCULAR at 12:57

## 2023-02-05 RX ADMIN — DOPAMINE HYDROCHLORIDE IN DEXTROSE 20 MCG/KG/MIN: 1.6 INJECTION, SOLUTION INTRAVENOUS at 04:31

## 2023-02-05 RX ADMIN — DOPAMINE HYDROCHLORIDE IN DEXTROSE 20 MCG/KG/MIN: 1.6 INJECTION, SOLUTION INTRAVENOUS at 23:58

## 2023-02-05 RX ADMIN — DOPAMINE HYDROCHLORIDE IN DEXTROSE 20 MCG/KG/MIN: 1.6 INJECTION, SOLUTION INTRAVENOUS at 00:00

## 2023-02-05 ASSESSMENT — PAIN DESCRIPTION - LOCATION: LOCATION: ARM

## 2023-02-05 ASSESSMENT — PAIN SCALES - GENERAL: PAINLEVEL_OUTOF10: 2

## 2023-02-05 NOTE — PROGRESS NOTES
Speech Language Pathology  Facility/Department: [address] NEURO ICU  Initial Speech/Language/Cognitive Assessment    NAME: Nela Reyes  : 1950   MRN: [id number]  ADMISSION DATE: 2023  ADMITTING DIAGNOSIS: has Acute ischemic stroke ([address] and ICAO (internal carotid artery occlusion), right on their problem list.      Date of Eval: 2023   Evaluating Therapist: Jacobo Montano      Primary Complaint:   Obtained from H&P  Initial Presentation (Admitted 2023):     59-year-old male with with past medical history of hypertension (on amlodipine) presented with complaint of left-sided numbness and weakness. As per patient, at around 11 AM he started feeling numb in the left upper extremity (going from shoulder down to the hand) along with weakness and having some loss of dexterity experienced while using his hand. He denies having any weakness in the left lower extremity but later on in the ED felt that left leg does not feel right. We called his sister and was brought to ER at [facility] with concern of stroke. On arrival to [facility] SVC's as BP was 138/68, . Patient had taken his morning amlodipine (unclear dosage) for hypertension. He denied using any antiplatelet/anticoagulation or statin. No history of smoking and drinks beer 2 times a week. On initial evaluation by stroke team, patient initially had NIH of 2 (left arm weakness and numbness). Pain:  Pain Assessment  Pain Assessment: None - Denies Pain  Pain Level: 2  Patient's Stated Pain Goal: 0 - No pain  Pain Location: Arm    Vision/ Hearing  Vision  Vision: Impaired  Vision Exceptions: Wears glasses at all times  Hearing  Hearing: Within functional limits    Assessment:  Cognitive Diagnosis: Pt presented with mild cognitive-communication deficits characterized by difficulty with delayed recall and verbal reasoning tasks. Pt. Presents with no dysarthria, no O/M deficits at this time.  ST to follow up and provide treatment to address noted deficits. Education provided. ST recommends 3-5x/week at this time. Pt reported the skill he feels has been the most difficult has been his recall. He also reports although he has been feeling better, he feels \"slow\". Recommendations:  Recommendations  Requires SLP Intervention: Yes  Patient Education: Reviewed resutls with pt  Patient Education Response: Verbalizes understanding             Plan:   Speech Therapy Prognosis  Prognosis: Fair  Prognosis Considerations: Age  Individuals consulted  Consulted and agree with results and recommendations: Patient;RN;Family member  RN Name: Filiberto Bhat    Goals:  Short Term Goals  Goal 1: Pt will completed verbal reasonings tasks with 90% accuracy independently. Goal 2: Pt will recall 3-5 units at a delay with 90% accuracy independently. Goal 3: Pt will use memory strategies in 4/5 opportunities independently.    Patient/family involved in developing goals and treatment plan: yes    Subjective:          Vision  Vision: Impaired  Vision Exceptions: Wears glasses at all times  Hearing  Hearing: Within functional limits           Objective:       Oral Motor   Labial: No impairment  Lingual: No impairment  Velum: No Impairment  Mandible: No impairment  Gag: Other (comment)    Motor Speech  Dysarthric Characteristics: None  Intelligibility: No impairment  Overall Impairment Severity: None    Auditory Comprehension  Comprehension: Within Functional Limits         Expression  Primary Mode of Expression: Verbal    Verbal Expression  Verbal Expression: Within functional limits              Cognition:      Orientation  Overall Orientation Status: Within Normal Limits  Attention  Attention: Within Functional Limits  Memory  Memory: Exceptions to [facility]  Short-term Memory: Mild (3/3 and 5/5 immediate; 2/3 delayed independent; 3/3 given cue)  Problem Solving  Problem Solving: Exceptions to [facility]  Verbal Reasoning Skills: Mild (antonyms 3/4 independent; homographs 2/2 mod cues; inductive reasoning 1/2 independent)        Prognosis:  Speech Therapy Prognosis  Prognosis: Fair  Prognosis Considerations: Age  Individuals consulted  Consulted and agree with results and recommendations: Patient;RN;Family member  RN Name: [facility]    Education:  Patient Education: Reviewed resutls with pt  Patient Education Response: Verbalizes understanding          Therapy Time:   Individual Concurrent Group Co-treatment   Time In 9365         Time Out 0925         Minutes 20                 Electronically signed by Caryl PLUNKETT CCC-SLP on 2/5/2023 at 10:08 AM

## 2023-02-05 NOTE — PROGRESS NOTES
Daily Progress Note  Neuro Critical Care    Patient Name: Homar First  Patient : 1950  Room/Bed: 0127/0127-01  Code Status: Full code  Allergies: No Known Allergies    CHIEF COMPLAINT:       Left-sided weakness. INTERVAL HISTORY      Initial Presentation (Admitted 2023):     67-year-old male with with past medical history of hypertension (on amlodipine) presented with complaint of left-sided numbness and weakness. As per patient, at around 11 AM he started feeling numb in the left upper extremity (going from shoulder down to the hand) along with weakness and having some loss of dexterity experienced while using his hand. He denies having any weakness in the left lower extremity but later on in the ED felt that left leg does not feel right. We called his sister and was brought to ER at [facility] with concern of stroke. On arrival to [facility] SVC's as BP was 138/68, . Patient had taken his morning amlodipine (unclear dosage) for hypertension. He denied using any antiplatelet/anticoagulation or statin. No history of smoking and drinks beer 2 times a week. On initial evaluation by stroke team, patient initially had NIH of 2 (left arm weakness and numbness). CT head without contrast: No acute abnormality. Remote left occipital infarct. CTA head and neck with contrast: Subtle linear hypodense focus in the proximal aspect of right ICA concerning for dissection versus right carotid terminus thrombus. Later on progressed to [place] of 8 (facial palsy, left arm, left leg, limb ataxia and sensory and dysarthria). Patient was given IV fluid 2 L and had improvement in exam again and NIH improved to 3. Due to pressure related changes favoring improved stenosis decided not to be obtained. And patient may benefit with stents with close ICU monitoring. An emergent thrombectomy for now.   Started on heparin, midodrine, aspirin, Brilinta and statin     On my eval, patient was awake alert and oriented x3 without any dysarthria or aphasia. No facial asymmetry noticed. No visual deficit noticed. Patient had numbness on the left side of the face left arm and left leg with difficulty appreciating sharp versus dull. Left upper extremity drift noticed. Left lower extremity 4/5 but no drift noticed. Patient and his sister was counseled about the management plan admission to the hospital in the ICU for close monitoring and further work-up. Last 24h:   Overnight patient had art line placed as he has been dropping systolic blood pressure but exam has been stable without any worsening. Midodrine received as patient was having mild PVCs. Given 1 L of normal saline and due to low SBP fluid increase from 100 to 125/h. Patient is stable. Mentions improvement in the numbness over the face and the left leg. On my evaluation, NIH 2 (numbness, drift in the left upper extremity). No ataxia. MRI pending. Consent obtained by neuro endovascular team for ICA stenting for 2023 in the presence of his son.      CURRENT MEDICATIONS:  SCHEDULED MEDICATIONS:   aspirin  81 mg Oral Daily    ticagrelor  90 mg Oral BID    atorvastatin  40 mg Oral Nightly     CONTINUOUS INFUSIONS:   sodium chloride      heparin 25,000 units in 0.9% sodium chloride 250 mL infusion 17 Units/kg/hr (23 035)    sodium chloride 125 mL/hr at 23 0356    DOPamine 20 mcg/kg/min (23 0431)     PRN MEDICATIONS:   sodium chloride flush, sodium chloride, dextrose, ondansetron **OR** ondansetron, polyethylene glycol, perflutren lipid microspheres, acetaminophen **OR** acetaminophen    VITALS:  Temperature Range: Temp: 98 °F (36.7 °C) Temp  Av.8 °F (36.6 °C)  Min: 96.8 °F (36 °C)  Max: 98.2 °F (36.8 °C)  BP Range: Systolic (30WKM), WWM:992 , Min:128 , LNH:201     Diastolic (12TBA), TZZ:00, Min:57, Max:120    Pulse Range: Pulse  Av.7  Min: 52  Max: 112  Respiration Range: Resp  Av.2  Min: 14  Max: 39  Current Pulse Ox: SpO2: 95 %  24HR Pulse Ox Range: SpO2  Av.9 %  Min: 90 %  Max: 99 %  Patient Vitals for the past 12 hrs:   BP Temp Pulse Resp SpO2   23 0500 (!) 156/68 -- 74 19 95 %   23 0445 (!) 168/89 -- (!) 112 14 95 %   23 0430 (!) 156/81 -- 72 18 95 %   23 0415 (!) 155/73 -- 68 17 --   23 0400 (!) 150/74 98 °F (36.7 °C) 70 17 95 %   23 0345 (!) 153/72 -- 69 17 --   23 0330 (!) 163/75 -- 74 23 --   23 0315 (!) 152/80 -- (!) 102 24 --   23 0300 (!) 160/73 -- 78 20 --   23 0245 (!) 156/75 -- 78 19 --   23 0230 (!) 152/68 -- 74 19 --   23 0215 (!) 154/70 -- 79 20 --   23 0200 (!) 154/77 (P) 98 °F (36.7 °C) 82 21 --   23 0145 (!) 155/71 -- 77 19 --   23 0130 (!) 154/77 -- 81 19 --   23 0115 (!) 147/83 -- 89 19 --   23 0100 (!) 153/75 -- 87 20 --   23 0045 (!) 152/78 -- 94 21 --   23 0030 (!) 161/79 -- (!) 111 19 --   23 0015 (!) 147/71 -- 76 16 --   23 0000 (!) 147/74 97.9 °F (36.6 °C) 87 18 --   23 2345 (!) 141/69 -- 72 16 --   23 2330 (!) 149/70 -- 75 14 --   23 2315 (!) 148/72 -- 85 18 --   23 2300 (!) 147/73 -- 73 15 --   23 2245 (!) 143/65 -- 80 16 --   23 2230 (!) 149/68 -- 75 18 --   23 2215 (!) 141/77 -- 98 18 --   23 2200 139/71 98.2 °F (36.8 °C) 95 18 94 %   23 2145 (!) 146/64 -- 94 22 --   23 2130 (!) 141/68 -- 87 19 --   23 2115 (!) 150/68 -- (!) 104 21 --   23 (!) 144/68 -- 99 20 95 %   23 -- -- 94 17 --   23 (!) 171/73 -- 85 20 --   23 (!) 175/70 -- 81 18 --   23 (!) 168/68 98 °F (36.7 °C) 79 21 97 %   23 1945 (!) 175/69 -- 80 23 --   23 1930 (!) 156/120 -- 98 (!) 39 --   23 1915 (!) 167/66 -- 76 16 --   23 1900 (!) 166/67 -- 76 17 96 %   23 1845 (!) 156/68 -- 69 17 95 %   23 1830 (!) 155/57 -- 74 17 94 %   23 1815 (!) 151/75 -- 66 18 91 %     Estimated body mass index is 26.63 kg/m² as calculated from the following:    Height as of this encounter: 5' 7\" (1.702 m). Weight as of this encounter: 170 lb (77.1 kg).  []<16 Severe malnutrition  []16-16.99 Moderate malnutrition  []17-18.49 Mild malnutrition  []18.5-24.9 Normal  [x]25-29.9 Overweight (not obese)  []30-34.9 Obese class 1 (Low Risk)  []35-39.9 Obese class 2 (Moderate Risk)  []?40 Obese class 3 (High Risk)    RECENT LABS:   Lab Results   Component Value Date    WBC 5.8 02/04/2023    HGB 12.0 (L) 02/04/2023    HCT 36.5 (L) 02/04/2023     02/04/2023     02/04/2023    K 4.6 02/04/2023     02/04/2023    CREATININE 1.07 02/04/2023    BUN 19 02/04/2023    CO2 22 02/04/2023    INR 1.0 02/04/2023     24 HOUR INTAKE/OUTPUT:  Intake/Output Summary (Last 24 hours) at 2/5/2023 0602  Last data filed at 2/5/2023 0400  Gross per 24 hour   Intake 2754.18 ml   Output 2185 ml   Net 569.18 ml       IMAGING:      Radiology Review:     1.) CT brain without contrast:  Impression   No acute intracranial abnormality. Remote left occipital lobe infarct. Findings were discussed with Dr. Juan J Rahman At 1:28 pm on 2/4/2023.             2. ) CTA Head/Neck:  Impression   Subtle linear hypodense focus in the proximal aspect of the right internal   carotid artery with subsequent tapering of flow, from minimal to no flow, in   the mid and distal right cervical internal carotid artery concerning for a   dissection. Alternatively this may be the result of a right carotid terminus   thrombus. Angiography may be helpful in further characterization. No significant flow within the intracranial portion of the right internal   carotid artery. No significant stenosis or occlusion within the remainder of the intracranial   vessels. Findings were discussed with Dr. Juan J Rahman At 1:37 pm on 2/4/2023. Labs and Images reviewed with:  [] Dr. Refugio Agarwal.  Margarita    [] Dr. Zeno Kanner Jeyson Ruiz  [x] Dr. Amirah Washington  [] There are no new interval images to review. PHYSICAL EXAM       CONSTITUTIONAL:  Well developed, well nourished, alert and oriented x 3, in no acute distress. GCS 15. Nontoxic. No dysarthria. No aphasia. HEAD:  normocephalic, atraumatic    EYES:  PERRLA, EOMI.   ENT:  moist mucous membranes   NECK:  supple, symmetric   LUNGS:  Equal air entry bilaterally   CARDIOVASCULAR:  normal s1 / s2, RRR, distal pulses intact   ABDOMEN:  Soft, no rigidity   NEUROLOGIC:  Mental Status:  A & O x3,awake             Cranial Nerves:    cranial nerves II-XII are grossly intact    Motor Exam:    Drift:  present -left upper extremity  Tone:  normal    Motor exam is 5 out of 5 all extremities with the exception of left upper extremity 4/5 and left lower extremity 4+/5    Sensory:    Touch:    Right Upper Extremity:  normal  Left Upper Extremity:  abnormal -same as yesterday. Difficulty appreciating dull and sharp touch. Improvement in numbness over the left side of the face  Right Lower Extremity:  normal  Left Lower Extremity:  abnormal -but improving as compared to yesterday    Deep Tendon Reflexes:    Right Bicep:  1+  Left Bicep:  1+  Right Knee:  2+  Left Knee:  1+    Plantar Response:  Right:  downgoing  Left:  downgoing    Clonus:  absent  Traore's:  absent    Coordination/Dysmetria:  Heel to Shin:  Right:  normal  Left: Normal  Finger to Nose:   Right:  normal  Left: Due to weakness  Dysdiadochokinesia: Not tested    Gait: Not tested   NIH Stroke Scale Total (if not done complete detailed one below):    1a.  Level of consciousness:  0 - alert; keenly responsive  1b. Level of consciousness questions:  0 - answers both questions correctly  1c. Level of consciousness questions:  0 - performs both tasks correctly  2. Best Gaze:  0 - normal  3. Visual:  0 - no visual loss  4. Facial Palsy:  0 - normal symmetric movement  5a.   Motor left arm:  1 - drift, limb holds 90 (or 45) degrees but drifts down before full 10 seconds: does not hit bed  5b. Motor right arm:  0 - no drift, limb holds 90 (or 45) degrees for full 10 seconds  6a. Motor left le - no drift; leg holds 30 degree position for full 5 seconds  6b. Motor right le - no drift; leg holds 30 degree position for full 5 seconds  7. Limb Ataxia:  0 - absent  8. Sensory:  0 - normal; no sensory loss  9. Best Language:  0 - no aphasia, normal  10. Dysarthria:  0 - normal  11. Extinction and Inattention:  0 - no abnormality  TOTAL: 2    DRAINS:  [x] There are no drains for Neuro Critical Care to monitor at this time. ASSESSMENT AND PLAN:       74-year-old male with with past medical history of hypertension (on amlodipine) presented with complaint of left-sided numbness and weakness. CTA concerning for right ICA tapering concerning for dissection versus thrombus. Fluctuation in NIH exam with improvement after midodrine and fluids. Admission to neuro critical care unit for close neurological exam monitoring and blood pressure goals. Started on dual antiplatelet and statin. Left sided weakness and numbness  CTA concerning for Rt ICA thrombus and dissection. NEUROLOGIC:  - Imaging   CT head without contrast: No acute abnormality. Remote left occipital infarct. CTA head and neck with contrast: Subtle linear hypodense focus in the proximal aspect of right ICA concerning for dissection versus right carotid terminus thrombus. -MRI brain without contrast pending.  - Neuroendocvas: If patient worsens will take to IR emergently for right ICA stenting if stable plan for intervention on Monday.  -Hemoglobin A1c pending.  -Lipid profile: Cholesterol 217, HDL 51, , triglyceride 99  - Echo to review global study.   -Given fluid bolus 2 L.>  Given additional normal saline 1 L overnight with maintenance increase to 125 mill per hour.  -Midodrine DC due to PVCs  -Dopamine gtt  -Heparin bolus 2000 units x 1 followed by heparin GTT 15u/kg  -Aspirin 650 mg x 1 followed by 81 mg daily.  -Brilinta 180 mg x 1 followed by 90 mg twice daily. -Atorvastatin 40 mg nightly> increased to 80 mg based on lipid profile  - Goal -180.  - Neuro checks per protocol    CARDIOVASCULAR:      BP Range: Systolic (58PVI), EUC:549 , Min:128 , DYC:108     Diastolic (18HYL), KALPESH:92, Min:57, Max:120    Pulse Range: Pulse  Av.7  Min: 52  Max: 112    - Goal -200  - Continue telemetry    PULMONARY:    Respiration Range: Resp  Av.2  Min: 14  Max: 39  Current Pulse Ox: SpO2: 95 %    -On room air  - CXR: No acute cardiopulmonary process    RENAL/FLUID/ELECTROLYTE:    Lab Results   Component Value Date/Time     2023 01:22 PM    K 4.6 2023 01:22 PM     2023 01:22 PM    CO2 22 2023 01:22 PM    BUN 19 2023 01:22 PM    CREATININE 1.07 2023 01:22 PM    CREATININE 1.10 2023 01:02 PM    GLUCOSE 104 2023 01:22 PM    CALCIUM 8.9 2023 01:22 PM          I/O last 3 completed shifts: In: 69.6 [I.V.:69.6]  Out: 585 [Urine:585]  I/O this shift:  In: 2684.6 [I.V.:2684.6]  Out: 1600 [Urine:1600]      - Urine output: Pending evaluation  - IVF: 125ml/hr  - Replace electrolytes PRN  - Daily BMP    GI/NUTRITION:  NUTRITION:  ADULT DIET; Regular  - Bowel regimen: Not indicated  - GI prophylaxis: not indicated. Recent Labs     23  1322   WBC 5.8   HGB 12.0*   HCT 36.5*   MCV 97.3            ID:  Temperature Range: Temp: 98 °F (36.7 °C) Temp  Av.8 °F (36.6 °C)  Min: 96.8 °F (36 °C)  Max: 98.2 °F (36.8 °C)  - Tmax   - Infectious work up   - Continue to monitor for fevers  - Daily CBC    HEME:     - Daily CBC    ENDOCRINE:  - Continue to monitor blood glucose, goal <180      OTHER:  - PT/OT/ST   - Code Status: Full code    PROPHYLAXIS:  Stress ulcer: not indicated    DVT PROPHYLAXIS:  - SCD sleeves - Thigh High   Oh heparin.     DISPOSITION:  [x] To remain ICU: Right ICA dissection vs thrombus. Fluctuating neuro exam. Watch if stenting required. [] OK for out of ICU from Neuro Critical Care standpoint    We will continue to follow along. For any changes in exam or patient status please contact Neuro Critical Care.       Vince Hurd MD  Neuro Critical Care  2/5/2023     6:02 AM

## 2023-02-05 NOTE — PROGRESS NOTES
Pt's SBP goal is ordered 160-200. Dr. Petty Rees notified and came to bedside @2000 when patient was maxed on Dopamine and SBP was under parameters. Dr. Daniela Benjamin notified and stated as long as the patient's exam was stable, SBP can be below parameters. ART line placed, will continue to closely monitor.

## 2023-02-05 NOTE — PLAN OF CARE
Problem: Skin/Tissue Integrity  Goal: Absence of new skin breakdown  Description: 1. Monitor for areas of redness and/or skin breakdown  2. Assess vascular access sites hourly  3. Every 4-6 hours minimum:  Change oxygen saturation probe site  4. Every 4-6 hours:  If on nasal continuous positive airway pressure, respiratory therapy assess nares and determine need for appliance change or resting period.   2/5/2023 1812 by Jordan Mejia RN  Outcome: Progressing     Problem: ABCDS Injury Assessment  Goal: Absence of physical injury  2/5/2023 1812 by Jordan Mejia RN  Outcome: Progressing     Problem: Safety - Adult  Goal: Free from fall injury  2/5/2023 1812 by Jordan Mejia RN  Outcome: Progressing

## 2023-02-05 NOTE — PLAN OF CARE
Problem: Discharge Planning  Goal: Discharge to home or other facility with appropriate resources  Outcome: Progressing     Problem: Skin/Tissue Integrity  Goal: Absence of new skin breakdown  Description: 1. Monitor for areas of redness and/or skin breakdown  2. Assess vascular access sites hourly  3. Every 4-6 hours minimum:  Change oxygen saturation probe site  4. Every 4-6 hours:  If on nasal continuous positive airway pressure, respiratory therapy assess nares and determine need for appliance change or resting period.   2/5/2023 0424 by Taj Waters RN  Outcome: Progressing     Problem: ABCDS Injury Assessment  Goal: Absence of physical injury  2/5/2023 0424 by Taj Waters RN  Outcome: Progressing     Problem: Safety - Adult  Goal: Free from fall injury  2/5/2023 0424 by Taj Waters RN  Outcome: Progressing

## 2023-02-06 ENCOUNTER — APPOINTMENT (OUTPATIENT)
Dept: INTERVENTIONAL RADIOLOGY/VASCULAR | Age: 73
End: 2023-02-06
Payer: MEDICARE

## 2023-02-06 ENCOUNTER — APPOINTMENT (OUTPATIENT)
Dept: CT IMAGING | Age: 73
End: 2023-02-06
Payer: MEDICARE

## 2023-02-06 PROBLEM — R53.1 LEFT-SIDED WEAKNESS: Status: ACTIVE | Noted: 2023-02-06

## 2023-02-06 LAB
ABSOLUTE EOS #: 0.11 K/UL (ref 0–0.44)
ABSOLUTE IMMATURE GRANULOCYTE: 0.04 K/UL (ref 0–0.3)
ABSOLUTE LYMPH #: 1.16 K/UL (ref 1.1–3.7)
ABSOLUTE MONO #: 0.72 K/UL (ref 0.1–1.2)
ANION GAP SERPL CALCULATED.3IONS-SCNC: 12 MMOL/L (ref 9–17)
BASOPHILS # BLD: 0 % (ref 0–2)
BASOPHILS ABSOLUTE: <0.03 K/UL (ref 0–0.2)
BUN SERPL-MCNC: 8 MG/DL (ref 8–23)
CALCIUM SERPL-MCNC: 9.2 MG/DL (ref 8.6–10.4)
CHLORIDE SERPL-SCNC: 104 MMOL/L (ref 98–107)
CO2 SERPL-SCNC: 20 MMOL/L (ref 20–31)
CREAT SERPL-MCNC: 0.82 MG/DL (ref 0.7–1.2)
EKG ATRIAL RATE: 55 BPM
EKG ATRIAL RATE: 87 BPM
EKG P AXIS: 57 DEGREES
EKG P AXIS: 71 DEGREES
EKG P-R INTERVAL: 144 MS
EKG P-R INTERVAL: 150 MS
EKG Q-T INTERVAL: 338 MS
EKG Q-T INTERVAL: 422 MS
EKG QRS DURATION: 94 MS
EKG QRS DURATION: 96 MS
EKG QTC CALCULATION (BAZETT): 403 MS
EKG QTC CALCULATION (BAZETT): 406 MS
EKG R AXIS: -2 DEGREES
EKG R AXIS: -7 DEGREES
EKG T AXIS: 32 DEGREES
EKG T AXIS: 9 DEGREES
EKG VENTRICULAR RATE: 55 BPM
EKG VENTRICULAR RATE: 87 BPM
EOSINOPHILS RELATIVE PERCENT: 1 % (ref 1–4)
GFR SERPL CREATININE-BSD FRML MDRD: >60 ML/MIN/1.73M2
GLUCOSE SERPL-MCNC: 135 MG/DL (ref 70–99)
HCT VFR BLD AUTO: 38.5 % (ref 40.7–50.3)
HGB BLD-MCNC: 13.5 G/DL (ref 13–17)
IMMATURE GRANULOCYTES: 1 %
LYMPHOCYTES # BLD: 14 % (ref 24–43)
MCH RBC QN AUTO: 31.7 PG (ref 25.2–33.5)
MCHC RBC AUTO-ENTMCNC: 35.1 G/DL (ref 28.4–34.8)
MCV RBC AUTO: 90.4 FL (ref 82.6–102.9)
MONOCYTES # BLD: 9 % (ref 3–12)
NRBC AUTOMATED: 0 PER 100 WBC
PARTIAL THROMBOPLASTIN TIME: 53.1 SEC (ref 20.5–30.5)
PARTIAL THROMBOPLASTIN TIME: 54.8 SEC (ref 20.5–30.5)
PARTIAL THROMBOPLASTIN TIME: 55.1 SEC (ref 20.5–30.5)
PARTIAL THROMBOPLASTIN TIME: 81.4 SEC (ref 20.5–30.5)
PDW BLD-RTO: 11.4 % (ref 11.8–14.4)
PLATELET # BLD AUTO: 236 K/UL (ref 138–453)
PMV BLD AUTO: 10 FL (ref 8.1–13.5)
POTASSIUM SERPL-SCNC: 4 MMOL/L (ref 3.7–5.3)
RBC # BLD: 4.26 M/UL (ref 4.21–5.77)
REASON FOR REJECTION: NORMAL
SEG NEUTROPHILS: 75 % (ref 36–65)
SEGMENTED NEUTROPHILS ABSOLUTE COUNT: 6.24 K/UL (ref 1.5–8.1)
SODIUM SERPL-SCNC: 136 MMOL/L (ref 135–144)
WBC # BLD AUTO: 8.3 K/UL (ref 3.5–11.3)
ZZ NTE CLEAN UP: ORDERED TEST: NORMAL
ZZ NTE WITH NAME CLEAN UP: SPECIMEN SOURCE: NORMAL

## 2023-02-06 PROCEDURE — 6360000002 HC RX W HCPCS: Performed by: STUDENT IN AN ORGANIZED HEALTH CARE EDUCATION/TRAINING PROGRAM

## 2023-02-06 PROCEDURE — C1769 GUIDE WIRE: HCPCS

## 2023-02-06 PROCEDURE — 2580000003 HC RX 258: Performed by: STUDENT IN AN ORGANIZED HEALTH CARE EDUCATION/TRAINING PROGRAM

## 2023-02-06 PROCEDURE — 6360000002 HC RX W HCPCS: Performed by: NURSE PRACTITIONER

## 2023-02-06 PROCEDURE — 2580000003 HC RX 258: Performed by: FAMILY MEDICINE

## 2023-02-06 PROCEDURE — 99221 1ST HOSP IP/OBS SF/LOW 40: CPT | Performed by: STUDENT IN AN ORGANIZED HEALTH CARE EDUCATION/TRAINING PROGRAM

## 2023-02-06 PROCEDURE — 93005 ELECTROCARDIOGRAM TRACING: CPT | Performed by: NURSE PRACTITIONER

## 2023-02-06 PROCEDURE — 85730 THROMBOPLASTIN TIME PARTIAL: CPT

## 2023-02-06 PROCEDURE — 6360000002 HC RX W HCPCS: Performed by: PSYCHIATRY & NEUROLOGY

## 2023-02-06 PROCEDURE — 6370000000 HC RX 637 (ALT 250 FOR IP): Performed by: STUDENT IN AN ORGANIZED HEALTH CARE EDUCATION/TRAINING PROGRAM

## 2023-02-06 PROCEDURE — 99153 MOD SED SAME PHYS/QHP EA: CPT

## 2023-02-06 PROCEDURE — 2709999900 HC NON-CHARGEABLE SUPPLY

## 2023-02-06 PROCEDURE — C1887 CATHETER, GUIDING: HCPCS

## 2023-02-06 PROCEDURE — 2580000003 HC RX 258: Performed by: PSYCHIATRY & NEUROLOGY

## 2023-02-06 PROCEDURE — 36224 PLACE CATH CAROTD ART: CPT

## 2023-02-06 PROCEDURE — 2000000000 HC ICU R&B

## 2023-02-06 PROCEDURE — 99152 MOD SED SAME PHYS/QHP 5/>YRS: CPT

## 2023-02-06 PROCEDURE — 75710 ARTERY X-RAYS ARM/LEG: CPT

## 2023-02-06 PROCEDURE — C1894 INTRO/SHEATH, NON-LASER: HCPCS

## 2023-02-06 PROCEDURE — 70498 CT ANGIOGRAPHY NECK: CPT

## 2023-02-06 PROCEDURE — 97129 THER IVNTJ 1ST 15 MIN: CPT

## 2023-02-06 PROCEDURE — 80048 BASIC METABOLIC PNL TOTAL CA: CPT

## 2023-02-06 PROCEDURE — 85025 COMPLETE CBC W/AUTO DIFF WBC: CPT

## 2023-02-06 PROCEDURE — 93005 ELECTROCARDIOGRAM TRACING: CPT | Performed by: PSYCHIATRY & NEUROLOGY

## 2023-02-06 PROCEDURE — 99232 SBSQ HOSP IP/OBS MODERATE 35: CPT | Performed by: PSYCHIATRY & NEUROLOGY

## 2023-02-06 PROCEDURE — 6360000002 HC RX W HCPCS: Performed by: FAMILY MEDICINE

## 2023-02-06 PROCEDURE — B41F1ZZ FLUOROSCOPY OF RIGHT LOWER EXTREMITY ARTERIES USING LOW OSMOLAR CONTRAST: ICD-10-PCS | Performed by: PSYCHIATRY & NEUROLOGY

## 2023-02-06 PROCEDURE — 36415 COLL VENOUS BLD VENIPUNCTURE: CPT

## 2023-02-06 PROCEDURE — B3131ZZ FLUOROSCOPY OF RIGHT COMMON CAROTID ARTERY USING LOW OSMOLAR CONTRAST: ICD-10-PCS | Performed by: PSYCHIATRY & NEUROLOGY

## 2023-02-06 PROCEDURE — 97130 THER IVNTJ EA ADDL 15 MIN: CPT

## 2023-02-06 PROCEDURE — 6360000004 HC RX CONTRAST MEDICATION: Performed by: PSYCHIATRY & NEUROLOGY

## 2023-02-06 PROCEDURE — 2500000003 HC RX 250 WO HCPCS: Performed by: STUDENT IN AN ORGANIZED HEALTH CARE EDUCATION/TRAINING PROGRAM

## 2023-02-06 PROCEDURE — B3161ZZ FLUOROSCOPY OF RIGHT INTERNAL CAROTID ARTERY USING LOW OSMOLAR CONTRAST: ICD-10-PCS | Performed by: PSYCHIATRY & NEUROLOGY

## 2023-02-06 RX ORDER — SODIUM CHLORIDE 0.9 % (FLUSH) 0.9 %
5-40 SYRINGE (ML) INJECTION PRN
Status: DISCONTINUED | OUTPATIENT
Start: 2023-02-06 | End: 2023-02-09 | Stop reason: HOSPADM

## 2023-02-06 RX ORDER — NITROGLYCERIN 20 MG/100ML
INJECTION INTRAVENOUS
Status: COMPLETED | OUTPATIENT
Start: 2023-02-06 | End: 2023-02-06

## 2023-02-06 RX ORDER — ACETAMINOPHEN 325 MG/1
650 TABLET ORAL EVERY 4 HOURS PRN
Status: DISCONTINUED | OUTPATIENT
Start: 2023-02-06 | End: 2023-02-07 | Stop reason: SDUPTHER

## 2023-02-06 RX ORDER — DOPAMINE HYDROCHLORIDE 160 MG/100ML
1-20 INJECTION, SOLUTION INTRAVENOUS CONTINUOUS
Status: DISCONTINUED | OUTPATIENT
Start: 2023-02-06 | End: 2023-02-08

## 2023-02-06 RX ORDER — SODIUM CHLORIDE 0.9 % (FLUSH) 0.9 %
5-40 SYRINGE (ML) INJECTION EVERY 12 HOURS SCHEDULED
Status: DISCONTINUED | OUTPATIENT
Start: 2023-02-06 | End: 2023-02-09 | Stop reason: HOSPADM

## 2023-02-06 RX ORDER — VERAPAMIL HYDROCHLORIDE 2.5 MG/ML
2.5 INJECTION, SOLUTION INTRAVENOUS ONCE
Status: DISCONTINUED | OUTPATIENT
Start: 2023-02-06 | End: 2023-02-06 | Stop reason: SDUPTHER

## 2023-02-06 RX ORDER — FENTANYL CITRATE 50 UG/ML
INJECTION, SOLUTION INTRAMUSCULAR; INTRAVENOUS
Status: COMPLETED | OUTPATIENT
Start: 2023-02-06 | End: 2023-02-06

## 2023-02-06 RX ORDER — MIDODRINE HYDROCHLORIDE 5 MG/1
5 TABLET ORAL ONCE
Status: COMPLETED | OUTPATIENT
Start: 2023-02-07 | End: 2023-02-07

## 2023-02-06 RX ORDER — MAGNESIUM SULFATE 1 G/100ML
1000 INJECTION INTRAVENOUS ONCE
Status: COMPLETED | OUTPATIENT
Start: 2023-02-06 | End: 2023-02-06

## 2023-02-06 RX ORDER — SODIUM CHLORIDE 9 MG/ML
INJECTION, SOLUTION INTRAVENOUS PRN
Status: DISCONTINUED | OUTPATIENT
Start: 2023-02-06 | End: 2023-02-09 | Stop reason: HOSPADM

## 2023-02-06 RX ORDER — HEPARIN SODIUM 1000 [USP'U]/ML
INJECTION, SOLUTION INTRAVENOUS; SUBCUTANEOUS
Status: COMPLETED | OUTPATIENT
Start: 2023-02-06 | End: 2023-02-06

## 2023-02-06 RX ORDER — ONDANSETRON 4 MG/1
4 TABLET, ORALLY DISINTEGRATING ORAL EVERY 8 HOURS PRN
Status: DISCONTINUED | OUTPATIENT
Start: 2023-02-06 | End: 2023-02-07 | Stop reason: SDUPTHER

## 2023-02-06 RX ORDER — ONDANSETRON 2 MG/ML
4 INJECTION INTRAMUSCULAR; INTRAVENOUS EVERY 6 HOURS PRN
Status: DISCONTINUED | OUTPATIENT
Start: 2023-02-06 | End: 2023-02-07 | Stop reason: SDUPTHER

## 2023-02-06 RX ORDER — VERAPAMIL HYDROCHLORIDE 2.5 MG/ML
INJECTION, SOLUTION INTRAVENOUS
Status: COMPLETED | OUTPATIENT
Start: 2023-02-06 | End: 2023-02-06

## 2023-02-06 RX ORDER — MIDAZOLAM HYDROCHLORIDE 2 MG/2ML
INJECTION, SOLUTION INTRAMUSCULAR; INTRAVENOUS
Status: COMPLETED | OUTPATIENT
Start: 2023-02-06 | End: 2023-02-06

## 2023-02-06 RX ORDER — SODIUM NITROPRUSSIDE 25 MG/ML
200 INJECTION INTRAVENOUS ONCE
Status: DISCONTINUED | OUTPATIENT
Start: 2023-02-06 | End: 2023-02-07

## 2023-02-06 RX ORDER — IODIXANOL 320 MG/ML
100 INJECTION, SOLUTION INTRAVASCULAR
Status: COMPLETED | OUTPATIENT
Start: 2023-02-06 | End: 2023-02-06

## 2023-02-06 RX ORDER — MIDODRINE HYDROCHLORIDE 5 MG/1
10 TABLET ORAL 3 TIMES DAILY
Status: DISCONTINUED | OUTPATIENT
Start: 2023-02-06 | End: 2023-02-08

## 2023-02-06 RX ADMIN — MIDAZOLAM HYDROCHLORIDE 1 MG: 1 INJECTION, SOLUTION INTRAMUSCULAR; INTRAVENOUS at 15:44

## 2023-02-06 RX ADMIN — TICAGRELOR 90 MG: 90 TABLET ORAL at 20:57

## 2023-02-06 RX ADMIN — MIDODRINE HYDROCHLORIDE 10 MG: 5 TABLET ORAL at 17:57

## 2023-02-06 RX ADMIN — ATORVASTATIN CALCIUM 80 MG: 80 TABLET, FILM COATED ORAL at 20:13

## 2023-02-06 RX ADMIN — MIDODRINE HYDROCHLORIDE 10 MG: 5 TABLET ORAL at 20:12

## 2023-02-06 RX ADMIN — FENTANYL CITRATE 100 MCG: 50 INJECTION, SOLUTION INTRAMUSCULAR; INTRAVENOUS at 15:44

## 2023-02-06 RX ADMIN — NITROGLYCERIN 200 MCG: 20 INJECTION INTRAVENOUS at 15:48

## 2023-02-06 RX ADMIN — DOPAMINE HYDROCHLORIDE IN DEXTROSE 16 MCG/KG/MIN: 1.6 INJECTION, SOLUTION INTRAVENOUS at 16:40

## 2023-02-06 RX ADMIN — IODIXANOL 28 ML: 320 INJECTION, SOLUTION INTRAVASCULAR at 16:21

## 2023-02-06 RX ADMIN — HEPARIN SODIUM 2000 UNITS: 1000 INJECTION, SOLUTION INTRAVENOUS; SUBCUTANEOUS at 15:48

## 2023-02-06 RX ADMIN — VERAPAMIL HYDROCHLORIDE 2.5 MG: 2.5 INJECTION INTRAVENOUS at 15:48

## 2023-02-06 RX ADMIN — HEPARIN SODIUM 19 UNITS/KG/HR: 5000 INJECTION, SOLUTION INTRAVENOUS; SUBCUTANEOUS at 11:26

## 2023-02-06 RX ADMIN — TICAGRELOR 90 MG: 90 TABLET ORAL at 08:16

## 2023-02-06 RX ADMIN — SODIUM CHLORIDE, PRESERVATIVE FREE 10 ML: 5 INJECTION INTRAVENOUS at 20:13

## 2023-02-06 RX ADMIN — SODIUM CHLORIDE: 9 INJECTION, SOLUTION INTRAVENOUS at 20:55

## 2023-02-06 RX ADMIN — IOPAMIDOL 90 ML: 755 INJECTION, SOLUTION INTRAVENOUS at 06:08

## 2023-02-06 RX ADMIN — ASPIRIN 81 MG: 81 TABLET, CHEWABLE ORAL at 08:16

## 2023-02-06 RX ADMIN — MAGNESIUM SULFATE HEPTAHYDRATE 1000 MG: 1 INJECTION, SOLUTION INTRAVENOUS at 12:29

## 2023-02-06 RX ADMIN — DOPAMINE HYDROCHLORIDE IN DEXTROSE 20 MCG/KG/MIN: 1.6 INJECTION, SOLUTION INTRAVENOUS at 11:21

## 2023-02-06 ASSESSMENT — ENCOUNTER SYMPTOMS
SHORTNESS OF BREATH: 0
BACK PAIN: 0
ABDOMINAL PAIN: 0

## 2023-02-06 NOTE — PROGRESS NOTES
Physical Therapy Cancel Note      DATE: 2023    NAME: Trisha Sepulveda  MRN: [id number]   : 1950      Patient not seen this date for Physical Therapy due to:    Possible carotid stent placement later today; check pt status 23.         Electronically signed by Mayra Chambers PT on 2023 at 11:30 AM

## 2023-02-06 NOTE — PROGRESS NOTES
Endovascular Neurosurgery Progress Note      Reason for evaluation: R ICA occlusion     SUBJECTIVE:     Improved overnight, no more left arm weakness and ataxia, now just decrease sensation on the left side      Review of Systems:  CONSTITUTIONAL:  negative for fevers, chills, fatigue and malaise    EYES:  negative for double vision, blurred vision and photophobia     HEENT:  negative for tinnitus, epistaxis and sore throat    RESPIRATORY:  negative for cough, shortness of breath, wheezing    CARDIOVASCULAR:  negative for chest pain, palpitations, syncope, edema    GASTROINTESTINAL:  negative for nausea, vomiting    GENITOURINARY:  negative for incontinence    MUSCULOSKELETAL:  negative for neck or back pain    NEUROLOGICAL:  Negative for weakness and tingling  negative for headaches and dizziness    PSYCHIATRIC:  negative for anxiety      Review of systems otherwise negative. OBJECTIVE:     Vitals:    02/05/23 1900   BP: (!) 148/87   Pulse: 88   Resp: 26   Temp:    SpO2: 92%        General:  Gen: normal habitus, NAD  HEENT: NCAT, mucosa moist  Cvs: RRR, S1 S2 normal  Resp: symmetric unlabored breathing  Abd: s/nd/nt  Ext: no edema  Skin: no lesions seen, warm and dry    Neuro:  Gen: awake and alert, oriented x4. Lang/speech: no aphasia and dysarthria. Follows commands. CN: PERRL, EOMI, VFF, V1-3 intact, left face droop, hearing intact, shoulder shrug symmetric, tongue midline  Motor: grossly 5/5 UE and LE no drift  Sense:normal on the right, no sensation on the left  Coord: normal FTN and HTS  DTR: deferred  Gait: deferred    NIH Stroke Scale:   1a  Level of consciousness: 0 - alert; keenly responsive   1b. LOC questions:  0 - answers both questions correctly   1c. LOC commands: 0 - performs both tasks correctly   2. Best Gaze: 0 - normal   3. Visual: 0 - no visual loss   4. Facial Palsy: 0 - normal symmetric movement   5a. Motor left arm: 0   5b.   Motor right arm: 0 - no drift, limb holds 90 (or 45) degrees for full 10 seconds   6a. Motor left le - no drift; leg holds 30 degree position for full 5 seconds   6b  Motor right le - no drift; leg holds 30 degree position for full 5 seconds   7. Limb Ataxia: 0 - absent   8. Sensory: 1 - mild to moderate sensory loss; patient feels pinprick is less sharp or is dull on the affected side; there is a loss of superficial pain with pinprick but patient is aware of being touched    9. Best Language:  0 - no aphasia, normal   10. Dysarthria: 0 - normal   11. Extinction and Inattention: 0 - no abnormality         Total:   1     MRS: 2      LABS:   Reviewed. Lab Results   Component Value Date    HGB 13.7 2023    WBC 9.7 2023     2023     2023    BUN 10 2023    CREATININE 0.80 2023    MG 1.9 2023    APTT 52.0 (H) 2023    INR 1.0 2023      No results found for: COVID19    RADIOLOGY:   Images were personally reviewed including:    CTA head neck 23            MRI brain w/o con on 23: right posterior frontal parietal lobe acute stroke      ASSESSMENT:     75year old man with HTN, p/w acute onset of left hemiparesis, and found to have R ICA occlusion with right posterior frontal-parietal lobe acute stroke      PLAN:     - con't aspirin 81mg daily  - con't brillinta 90mg BID  - con't heparin gtt, goal PTT 50-70  - con't lipitor 80mg daily  - ideally SBP in the 160-200, however, despite max dose dopamine patient BP hover around 150 to 160, BP in the 140-200 is ok as long as patient is neurologically improving and not having symptoms    - repeat CTA head/neck tomorrow, depending on the result will then decide if he needs to have endovascular intervention    Case discussed with Dr. Mary Grace Chavez attending.     Elicia Booth MD  Stroke, [facility]  [id number]  Electronically signed 2/5/2023 at 7:55 PM

## 2023-02-06 NOTE — BRIEF OP NOTE
Brief Postoperative Note                  [facility]    NEUROENDOVASCULAR SERVICE: POST-OP NOTE: 2/6/2023    Pt Name: Marquis Faith  MRN: [id number]  [place]: 1950  Date of Procedure: 2/6/2023  Primary Care Physician: Natalie Vitale MD        Pre-Procedural Diagnosis:Right ICA supraclinoid segment stenosis   Post-Procedural Diagnosis:Same as above       Procedure Performed:Diagnostic Cerebral Angiogram    Surgeon:   Arias Griffith MD    Fellow:  Bertina Sever, MD and Sang Montenegro MD     Assisting Tech:  Robin Haile    PRE-PROCEDURAL EXAM:  Prestroke baseline mRS MODIFIED CHANTEL SCORE: 1 - No significant disability: despite symptoms, able to carry out all usual duties and activities. Anesthesia: IV Moderate Sedation  Complications: none    Intra-Operative EXAM:  Neurological exam performed and unchanged from initial H&P or consult    EBL: < less than 50       Cc            Specimens: Were not Obtained  Contrast:     Visipaque 320 low osmolar 28 Cc             Fluoro: 7.8 min    Findings:  Please see dictated Radiology note for further details  Right ICA supraclinoid segment stenosis just distal to the origin of the right ophthalmic artery with super-imposed thrombosis. Stenosis measuring about 78% by WASID criteria             POST-PROCEDURAL EXAM :   Stable neurological Exam  Neurological exam performed and unchanged from initial H&P or consult    Closure:  right TR Band 5   F        POST-PROCEDURAL MONITORING : see orders  Disposition: Neuro ICU      Recommendations:  Back to Neuro ICU  Do not bend right leg for 3 hours. Groin checks per protocol. Peripheral pulse checks per protocol. SBP goal 120-180 mm Hg   C/w Hep gtt   C/w ASA and Brilinta   Wean off Dopamine gtt   Right ICA endovascular stenting on 02/07   Follow up with Bertina Sever, MD  2 weeks after discharge and Dr. Kenny Oslon 3 months after discharge.         Bertina Sever, MD Margean Ponds, MD   Pager: CHIEF COMPLAINT:   9-month well-child-check.    HISTORY OF PRESENT ILLNESS:  Isha is a 10 month old female who presents for a well-child-exam.  Patient presents with dad.    Concerns:  - Currently teething so not sleep well.  - Mild eczema on belly.  - Stands on tip toes occasionally (not more than 50% of the time.    [place]  Depression Scale (EDPS)  Not completed as dad is here today.    Feeding.    bottle every 3 hours. Isha is eating solids well.     Utilizing vitamin D? No    Elimination.   Normal wet diapers and bowel movements.    Sleeping.   Up at night 0 times.  Sleeping arrangements Crib. Napping well without problem.    Developmental Screening (PEDS-DM):   Ages and Stages questionnaire reviewed.  Communication:   Gross Motor:   Fine Motor:   Problem Solvin/60  Personal-Social:     Immunizations.    No significant reactions to the previous immunizations .    SOCIAL:  Primary caretakers: Mother and father.   Siblings: None.  Smoke exposure: None  : None.  Temperament is usually content. She continues to be more active and is exploring her environment more.    There are no active problems to display for this patient.    No outpatient medications have been marked as taking for the 2/10/21 encounter (Office Visit) with GAY Malagon.     ALLERGIES:  No Known Allergies    PHYSICAL EXAM:  Pulse 136, resp. rate (!) 26, height 29\" (73.7 cm), weight 10.4 kg, head circumference 47 cm (18.5\").  95 %ile (Z= 1.64) based on WHO (Girls, 0-2 years) weight-for-age data using vitals from 2/10/2021.  82 %ile (Z= 0.93) based on WHO (Girls, 0-2 years) Length-for-age data based on Length recorded on 2/10/2021.  98 %ile (Z= 2.08) based on WHO (Girls, 0-2 years) head circumference-for-age based on Head Circumference recorded on 2/10/2021.  GENERAL:  Well-appearing female, nontoxic, no acute distress.  HEAD:  Normocephalic. Anterior fontanel open, soft and flat.  EYES:  Pupils  [phone]  Stroke, [facility]  [address]  Electronically signed 2/6/2023 at 4:19 PM reactive to light. Conjunctivae clear. Red reflex seen bilaterally. EOMI (Extraocular movements are intact).  EARS:  Normal pinnae, no preauricular skin tags or pit. External auditory canals patent. TMs (Tympanic membranes) normal without erythema or effusion.  NARES:  Patent. Without discharge.  THROAT:  Moist mucous membranes without erythema or oral lesions. Palate intact.  NECK:  Supple, no lymphadenopathy or masses. No neck pits. Clavicles intact.  HEART:  Regular rate and rhythm. No murmurs, rubs, or gallops. Normal S1 and S2. Femoral pulses palpable and symmetric without delay.    LUNGS:  Clear to auscultation bilaterally. No wheezes, rales, or rhonchi. Normal work of breathing.  ABDOMEN:  Soft, nondistended. No organomegaly or masses. Bowel sounds present.  GENITOURINARY:  Gio 1 female. No hernia present. No labial adhesions or lesions.  MUSCULOSKELETAL:  Hips within normal range of motion. Spine straight. Normal sacrum. Normal Ortolani and Garcia.  EXTREMITIES:  Warm, dry, without abnormalities.  NEUROLOGIC:  Normal tone, bulk, and strength. Patellar and ankle reflexes normal and symmetric.  SKIN: Warm and well perfused. No cyanosis. No rashes or bruises or other lesions.    ASSESSMENT AND PLAN:    Encounter for routine child health examination without abnormal findings  Isha is a 9 month old female here for a well-child check.   1. Growth and development reviewed. Will continue to monitor. Discussed advancing with solid foods and a sippy cup. Continue vitamin D supplement for exclusively breast fed infants. Discussed advancing with solid foods and a sippy cup.   2. Vaccinations reviewed/recommended. Up to date.  3. Dosing for acetaminophen and ibuprofen discussed.  4. Developmental Screening: An Ages & Stages Questionnaire was completed and reviewed.  5. All parental concerns and questions discussed. Anticipatory guidance provided including safety, teething, and expected developmental milestones and  norms including night terrors, stranger anxiety, and separation anxiety. Handout given.  6. We will see the baby back at 12 months of age or sooner should other issues arise.  7.   Isha Joaqiun indicates understanding of the above and is agreeable with the plan.    Return in about 3 months (around 5/10/2021) for 12 month well child.    GAY Mar, FNP-C    [address] [place] [address]  P: [phone]

## 2023-02-06 NOTE — PROGRESS NOTES
Daily Progress Note  Neuro Critical Care    Patient Name: Marilia Blount  Patient : 1950  Room/Bed: 0127/0127-01  Code Status: Full code  Allergies: No Known Allergies    CHIEF COMPLAINT:       Left-sided weakness. INTERVAL HISTORY      Initial Presentation (Admitted 2023):     79-year-old male with with past medical history of hypertension (on amlodipine) presented with complaint of left-sided numbness and weakness. As per patient, at around 11 AM he started feeling numb in the left upper extremity (going from shoulder down to the hand) along with weakness and having some loss of dexterity experienced while using his hand. He denies having any weakness in the left lower extremity but later on in the ED felt that left leg does not feel right. We called his sister and was brought to ER at [facility] with concern of stroke. On arrival to [facility] SVC's as BP was 138/68, . Patient had taken his morning amlodipine (unclear dosage) for hypertension. He denied using any antiplatelet/anticoagulation or statin. No history of smoking and drinks beer 2 times a week. On initial evaluation by stroke team, patient initially had NIH of 2 (left arm weakness and numbness). CT head without contrast: No acute abnormality. Remote left occipital infarct. CTA head and neck with contrast: Subtle linear hypodense focus in the proximal aspect of right ICA concerning for dissection versus right carotid terminus thrombus. Later on progressed to [place] of 8 (facial palsy, left arm, left leg, limb ataxia and sensory and dysarthria). Patient was given IV fluid 2 L and had improvement in exam again and NIH improved to 3. Due to pressure related changes favoring improved stenosis decided not to be obtained. And patient may benefit with stents with close ICU monitoring. An emergent thrombectomy for now.   Started on heparin, midodrine, aspirin, Brilinta and statin     On my eval, patient was awake alert and oriented x3 without any dysarthria or aphasia. No facial asymmetry noticed. No visual deficit noticed. Patient had numbness on the left side of the face left arm and left leg with difficulty appreciating sharp versus dull. Left upper extremity drift noticed. Left lower extremity 4/5 but no drift noticed. Patient and his sister was counseled about the management plan admission to the hospital in the ICU for close monitoring and further work-up. : Art line placed. Received midodrine, 1L NS due to low SBP. Improved left sided symptoms, especially numbness. NIH 2 (numbness, drift in LUE). Consent obtained by neuro endovascular team for ICA stenting 23    Last 24h:   No acute events overnight. Remains on Dopamine, 20 mcg/kg/min. Also on heparin. CTA of the head and neck performed, awaiting results. Patient denies any complaints. Continues to have mild LUE numbness, mild LUE drift.      CURRENT MEDICATIONS:  SCHEDULED MEDICATIONS:   atorvastatin  80 mg Oral Nightly    aspirin  81 mg Oral Daily    ticagrelor  90 mg Oral BID     CONTINUOUS INFUSIONS:   sodium chloride      heparin 25,000 units in 0.9% sodium chloride 250 mL infusion 19 Units/kg/hr (23)    sodium chloride 125 mL/hr at 23    DOPamine 20 mcg/kg/min (23 5417)     PRN MEDICATIONS:   sodium chloride flush, sodium chloride, ondansetron **OR** ondansetron, polyethylene glycol, perflutren lipid microspheres, acetaminophen **OR** acetaminophen    VITALS:  Temperature Range: Temp: 98 °F (36.7 °C) Temp  Av.7 °F (36.5 °C)  Min: 97.5 °F (36.4 °C)  Max: 98 °F (36.7 °C)  BP Range: Systolic (55GDA), KQB:893 , Min:97 , XED:630     Diastolic (36NRM), QIZ:87, Min:66, Max:88    Pulse Range: Pulse  Av  Min: 65  Max: 96  Respiration Range: Resp  Av.4  Min: 11  Max: 26  Current Pulse Ox: SpO2: 96 %  24HR Pulse Ox Range: SpO2  Av.1 %  Min: 88 %  Max: 98 %  Patient Vitals for the past 12 hrs:   BP Temp Pulse Resp SpO2 02/06/23 0630 (!) 156/72 98 °F (36.7 °C) 67 21 96 %   02/06/23 0530 (!) 145/77 -- 71 15 96 %   02/06/23 0500 (!) 148/79 -- 68 17 98 %   02/06/23 0430 (!) 153/74 -- 67 16 97 %   02/06/23 0400 131/75 97.6 °F (36.4 °C) 71 18 92 %   02/06/23 0330 (!) 141/83 -- 74 18 91 %   02/06/23 0300 (!) 147/77 -- 73 18 91 %   02/06/23 0230 139/73 -- 72 17 91 %   02/06/23 0200 (!) 146/71 97.8 °F (36.6 °C) 67 16 93 %   02/06/23 0130 (!) 140/73 -- 83 17 90 %   02/06/23 0100 (!) 159/81 -- 67 21 93 %   02/06/23 0030 (!) 155/82 -- 66 16 91 %   02/06/23 0000 (!) 156/77 97.6 °F (36.4 °C) 65 15 91 %   02/05/23 2330 (!) 160/75 -- 71 19 (!) 88 %   02/05/23 2300 (!) 157/78 -- 68 18 (!) 88 %   02/05/23 2230 (!) 152/76 -- 73 17 (!) 89 %   02/05/23 2200 (!) 156/75 98 °F (36.7 °C) 70 17 90 %   02/05/23 2130 (!) 140/72 -- 72 16 91 %   02/05/23 2100 (!) 156/79 -- 68 17 90 %   02/05/23 2045 (!) 159/81 -- 74 17 --   02/05/23 2030 (!) 159/79 -- 71 19 --   02/05/23 2015 (!) 162/86 -- 71 20 --   02/05/23 2000 (!) 152/81 97.8 °F (36.6 °C) 79 18 (!) 89 %   02/05/23 1945 (!) 152/88 -- 75 19 --   02/05/23 1930 (!) 149/85 -- 79 20 --       Estimated body mass index is 26.63 kg/m² as calculated from the following:    Height as of this encounter: 5' 7\" (1.702 m).     Weight as of this encounter: 170 lb (77.1 kg).  []<16 Severe malnutrition  []16-16.99 Moderate malnutrition  []17-18.49 Mild malnutrition  []18.5-24.9 Normal  [x]25-29.9 Overweight (not obese)  []30-34.9 Obese class 1 (Low Risk)  []35-39.9 Obese class 2 (Moderate Risk)  []?40 Obese class 3 (High Risk)    RECENT LABS:   Lab Results   Component Value Date    WBC 8.3 02/06/2023    HGB 13.5 02/06/2023    HCT 38.5 (L) 02/06/2023     02/06/2023    CHOL 217 (H) 02/05/2023    TRIG 99 02/05/2023    HDL 51 02/05/2023    LDLCHOLESTEROL 146 (H) 02/05/2023     02/05/2023    K 3.7 02/05/2023     02/05/2023    CREATININE 0.80 02/05/2023    BUN 10 02/05/2023    CO2 19 (L) 02/05/2023    INR 1.0 02/04/2023    LABA1C 4.8 02/05/2023     24 HOUR INTAKE/OUTPUT:  Intake/Output Summary (Last 24 hours) at 2/6/2023 0726  Last data filed at 2/6/2023 0700  Gross per 24 hour   Intake 2223.62 ml   Output 5200 ml   Net -2976.38 ml         IMAGING:      Radiology Review:     1.) CT brain without contrast:  Impression   No acute intracranial abnormality. Remote left occipital lobe infarct. Findings were discussed with Dr. Faustino Perera At 1:28 pm on 2/4/2023.             2. ) CTA Head/Neck:  Impression   Subtle linear hypodense focus in the proximal aspect of the right internal   carotid artery with subsequent tapering of flow, from minimal to no flow, in   the mid and distal right cervical internal carotid artery concerning for a   dissection. Alternatively this may be the result of a right carotid terminus   thrombus. Angiography may be helpful in further characterization. No significant flow within the intracranial portion of the right internal   carotid artery. No significant stenosis or occlusion within the remainder of the intracranial   vessels. Findings were discussed with Dr. Faustino Perera At 1:37 pm on 2/4/2023. Labs and Images reviewed with:  [] Dr. Danny Dunaway. Margarita    [] Dr. Kendrick Wilson  [x] Dr. Joanna Gambino  [] There are no new interval images to review. PHYSICAL EXAM       CONSTITUTIONAL:  Well developed, well nourished, alert and oriented x 3, in no acute distress. GCS 15. Nontoxic. No dysarthria. No aphasia.    HEAD:  normocephalic, atraumatic    EYES:  PERRLA, EOMI.   ENT:  moist mucous membranes   NECK:  supple, symmetric   LUNGS:  Equal air entry bilaterally   CARDIOVASCULAR:  normal s1 / s2, RRR, distal pulses intact   ABDOMEN:  Soft, no rigidity   NEUROLOGIC:  Mental Status:  A & O x3,awake             Cranial Nerves:    cranial nerves II-XII are grossly intact    Motor Exam:    Drift:  present - mild left upper extremity  Tone:  normal    Motor exam is 5 out of 5 all extremities with the exception of left upper extremity 4+/5 and left lower extremity 4+/5    Sensory:    Touch:    Right Upper Extremity:  normal  Left Upper Extremity:  abnormal - slightly decreased sensation compared to right. Right Lower Extremity:  normal  Left Lower Extremity:  normal    Plantar Response:  Right:  downgoing  Left:  downgoing    Clonus:  absent  Traore's:  absent    Coordination/Dysmetria:  Heel to Shin:  Right:  normal  Left: Normal  Finger to Nose:   Right:  normal  Left: Due to weakness  Dysdiadochokinesia: Not tested    Gait: Not tested   NIH Stroke Scale Total (if not done complete detailed one below):    1a.  Level of consciousness:  0 - alert; keenly responsive  1b. Level of consciousness questions:  0 - answers both questions correctly  1c. Level of consciousness questions:  0 - performs both tasks correctly  2. Best Gaze:  0 - normal  3. Visual:  0 - no visual loss  4. Facial Palsy:  0 - normal symmetric movement  5a. Motor left arm:  1 - drift, limb holds 90 (or 45) degrees but drifts down before full 10 seconds: does not hit bed  5b. Motor right arm:  0 - no drift, limb holds 90 (or 45) degrees for full 10 seconds  6a. Motor left le - no drift; leg holds 30 degree position for full 5 seconds  6b. Motor right le - no drift; leg holds 30 degree position for full 5 seconds  7. Limb Ataxia:  0 - absent  8. Sensory:  0 - normal; no sensory loss  9. Best Language:  0 - no aphasia, normal  10. Dysarthria:  0 - normal  11. Extinction and Inattention:  0 - no abnormality  TOTAL: 2    DRAINS:  [x] There are no drains for Neuro Critical Care to monitor at this time. ASSESSMENT AND PLAN:       18-year-old male with with past medical history of hypertension (on amlodipine) presented with complaint of left-sided numbness and weakness. CTA concerning for right ICA tapering concerning for dissection versus thrombus.   Fluctuation in NIH exam with improvement after midodrine and fluids. Admission to neuro critical care unit for close neurological exam monitoring and blood pressure goals. Started on dual antiplatelet and statin. Left sided weakness and numbness  CTA concerning for Rt ICA thrombus and dissection. NEUROLOGIC:  - Imaging   CT head without contrast: No acute abnormality. Remote left occipital infarct. CTA head and neck with contrast: Subtle linear hypodense focus in the proximal aspect of right ICA concerning for dissection versus right carotid terminus thrombus. -MRI brain without contrast pending.  - Neuroendocvas: If patient worsens will take to IR emergently for right ICA stenting if stable plan for intervention on Monday. - Hemoglobin A1c pending.  - Lipid profile: Cholesterol 217, HDL 51, , triglyceride 99  - Echo to review global study. -Given fluid bolus 2 L.>  Given additional normal saline 1 L overnight with maintenance increase to 125 mill per hour.  -Midodrine DC due to PVCs  -Dopamine gtt  -Heparin bolus 2000 units x 1 followed by heparin GTT 15u/kg  -Aspirin 650 mg x 1 followed by 81 mg daily.  -Brilinta 180 mg x 1 followed by 90 mg twice daily.   -Atorvastatin 40 mg nightly> increased to 80 mg based on lipid profile  - Goal -180.  - Neuro checks per protocol  - Plan for DSA today, follow up endovascular recommendations    CARDIOVASCULAR:  BP Range: Systolic (93GZS), RSL:035 , Min:97 , UGJ:824     Diastolic (61AOR), MGS:19, Min:66, Max:88    Pulse Range: Pulse  Av  Min: 65  Max: 96  - Right ICA dissection vs thrombus  - CTA H/N today: resolved occlusion of right ICA, moderate to severe stenosis upper portion of cavernous segment of right ICA and paraclinoid segment of right ICA   - Goal -200  - Continue telemetry  - On dopamine 20 mcg/kg/min   - Follow up Echo  - Continue ASA 81, brilinta, atorvastatin  - On Heparin gtt    PULMONARY:    Respiration Range: Resp  Av.4  Min: 11  Max: 26  Current Pulse Ox: SpO2: 96 %  - On room air  - CXR: No acute cardiopulmonary process    RENAL/FLUID/ELECTROLYTE:    Lab Results   Component Value Date/Time     2023 10:45 AM    K 3.7 2023 10:45 AM     2023 10:45 AM    CO2 19 2023 10:45 AM    BUN 10 2023 10:45 AM    CREATININE 0.80 2023 10:45 AM    GLUCOSE 168 2023 10:45 AM    CALCIUM 9.0 2023 10:45 AM          I/O last 3 completed shifts: In: 5474.1 [I.V.:5474.1]  Out: 7700 [Urine:7700]  No intake/output data recorded. - IVF: 125ml/hr  - Replace electrolytes PRN  - Daily BMP    GI/NUTRITION:  NUTRITION:  Diet NPO  - Bowel regimen: Not indicated  - GI prophylaxis: not indicated. Recent Labs     23  0703 23  1045 23  0600   WBC 8.3 9.7 10.0   HGB 13.5 13.7 13.6   HCT 38.5* 40.0* 39.8*   MCV 90.4 91.5 92.6    235 226         ID:  Temperature Range: Temp: 98 °F (36.7 °C) Temp  Av.7 °F (36.5 °C)  Min: 97.5 °F (36.4 °C)  Max: 98 °F (36.7 °C)    - Infectious work up unremarkable  - Continue to monitor for fevers  - Daily CBC    HEME:     - Daily CBC  - H/H stable    ENDOCRINE:  - Continue to monitor blood glucose, goal <180    OTHER:  - PT/OT/ST   - Code Status: Full code    PROPHYLAXIS:  Stress ulcer: not indicated    DVT PROPHYLAXIS:  - SCD sleeves - Thigh High   Oh heparin. DISPOSITION:  [x] To remain ICU: Right ICA dissection vs thrombus. Follow up DSA results and endovascular recommendations. [] OK for out of ICU from Neuro Critical Care standpoint    We will continue to follow along. For any changes in exam or patient status please contact Neuro Critical Care.       Joey Olivera DO  Neuro Critical Care  2023     7:26 AM

## 2023-02-06 NOTE — PLAN OF CARE
Problem: Discharge Planning  Goal: Discharge to home or other facility with appropriate resources  Outcome: Progressing     Problem: Skin/Tissue Integrity  Goal: Absence of new skin breakdown  Description: 1. Monitor for areas of redness and/or skin breakdown  2. Assess vascular access sites hourly  3. Every 4-6 hours minimum:  Change oxygen saturation probe site  4. Every 4-6 hours:  If on nasal continuous positive airway pressure, respiratory therapy assess nares and determine need for appliance change or resting period.   2/6/2023 0507 by Edward Larios RN  Outcome: Progressing     Problem: ABCDS Injury Assessment  Goal: Absence of physical injury  2/6/2023 0507 by Edward Larios RN  Outcome: Progressing     Problem: Safety - Adult  Goal: Free from fall injury  2/6/2023 0507 by Edward Larios RN  Outcome: Progressing

## 2023-02-06 NOTE — CARE COORDINATION
Spoke to patient and sister at bedside. Patient tells me he is to get a stent today and he still plans on returning home. He lives alone. There are no Therapy notes as of yet. Will need post stent therapy for correct discharge disposition. Original plan is to return home.

## 2023-02-06 NOTE — PROGRESS NOTES
Endovascular Neurosurgery Progress Note      Reason for evaluation: R ICA occlusion     SUBJECTIVE:     No acute events overnight, patient denied any headache, weakness or numbness     Review of Systems:  CONSTITUTIONAL:  negative for fevers, chills, fatigue and malaise    EYES:  negative for double vision, blurred vision and photophobia     HEENT:  negative for tinnitus, epistaxis and sore throat    RESPIRATORY:  negative for cough, shortness of breath, wheezing    CARDIOVASCULAR:  negative for chest pain, palpitations, syncope, edema    GASTROINTESTINAL:  negative for nausea, vomiting    GENITOURINARY:  negative for incontinence    MUSCULOSKELETAL:  negative for neck or back pain    NEUROLOGICAL:  Negative for weakness and tingling  negative for headaches and dizziness    PSYCHIATRIC:  negative for anxiety      Review of systems otherwise negative. OBJECTIVE:     Vitals:    02/06/23 0630   BP: (!) 156/72   Pulse: 67   Resp: 21   Temp: 98 °F (36.7 °C)   SpO2: 96%        General:  Gen: normal habitus, NAD  HEENT: NCAT, mucosa moist  Cvs: RRR, S1 S2 normal  Resp: symmetric unlabored breathing  Abd: s/nd/nt  Ext: no edema  Skin: no lesions seen, warm and dry    Neuro:  Gen: awake and alert, oriented x4. Lang/speech: no aphasia and dysarthria. Follows commands. CN: PERRL, EOMI, VFF, V1-3 intact, left face droop, hearing intact, shoulder shrug symmetric, tongue midline  Motor: grossly 5/5 UE and LE no drift  Sense:normal on the right, no sensation on the left  Coord: normal FTN and HTS  DTR: deferred  Gait: deferred    NIH Stroke Scale:   1a  Level of consciousness: 0 - alert; keenly responsive   1b. LOC questions:  0 - answers both questions correctly   1c. LOC commands: 0 - performs both tasks correctly   2. Best Gaze: 0 - normal   3. Visual: 0 - no visual loss   4. Facial Palsy: 0 - normal symmetric movement   5a. Motor left arm: 0   5b.   Motor right arm: 0 - no drift, limb holds 90 (or 45) degrees for full 10 seconds   6a. Motor left le - no drift; leg holds 30 degree position for full 5 seconds   6b  Motor right le - no drift; leg holds 30 degree position for full 5 seconds   7. Limb Ataxia: 0 - absent   8. Sensory: 1 - mild to moderate sensory loss; patient feels pinprick is less sharp or is dull on the affected side; there is a loss of superficial pain with pinprick but patient is aware of being touched    9. Best Language:  0 - no aphasia, normal   10. Dysarthria: 0 - normal   11. Extinction and Inattention: 1 - visual, tactile, auditory, spatial or personal inattention or extinction to bilateral simultaneous stimulation in one of the sensory modalities          Total:   2     MRS: 2      LABS:   Reviewed. Lab Results   Component Value Date    HGB 13.5 2023    WBC 8.3 2023     2023     2023    BUN 10 2023    CREATININE 0.80 2023    MG 1.9 2023    APTT 54.8 (H) 2023    INR 1.0 2023      No results found for: COVID19    RADIOLOGY:   Images were personally reviewed including:    CTA head and neck 23:       CTA head neck 23            MRI brain w/o con on 23: right posterior frontal parietal lobe acute stroke      ASSESSMENT:     75year old man with HTN, p/w acute onset of left hemiparesis, and found to have R ICA occlusion with right posterior frontal-parietal lobe acute stroke. Patient's symptoms significantly improved over last 2 days. Repeat CTA head and neck on  showed complete recanalization of the right intracranial ICA occlusion, with no evidence of large vessel occlusion   Risks and benefits discussed, risks include but are not limited to bruising, stroke, subarachnoid hemorrhage, death, retroperitoneal hematoma, pseudoaneurysm, lower extremity/renal/peripheral vascular compromise, informed consent obtained.       PLAN:     - con't aspirin 81mg daily  - con't brillinta 90mg BID  - discontinue hep gtt   - con't lipitor 80mg daily  - -160 mm Hg, may wean off pressors   - DSA today     Case discussed with Dr. Kenny Olson attending.     Bertina Sever MD  Stroke, [facility]  [id number]  Electronically signed 2/6/2023 at 8:03 AM

## 2023-02-06 NOTE — PLAN OF CARE
Problem: Discharge Planning  Goal: Discharge to home or other facility with appropriate resources  2/6/2023 1831 by Lisa Yeh RN  Outcome: Progressing  Flowsheets (Taken 2/6/2023 0800)  Discharge to home or other facility with appropriate resources: Identify barriers to discharge with patient and caregiver  2/6/2023 0507 by Willam Shaw RN  Outcome: Progressing     Problem: Skin/Tissue Integrity  Goal: Absence of new skin breakdown  Description: 1. Monitor for areas of redness and/or skin breakdown  2. Assess vascular access sites hourly  3. Every 4-6 hours minimum:  Change oxygen saturation probe site  4. Every 4-6 hours:  If on nasal continuous positive airway pressure, respiratory therapy assess nares and determine need for appliance change or resting period.   2/6/2023 1831 by Lsia Yeh RN  Outcome: Progressing  2/6/2023 0507 by Willam Shaw RN  Outcome: Progressing     Problem: ABCDS Injury Assessment  Goal: Absence of physical injury  2/6/2023 1831 by Lisa Yeh RN  Outcome: Progressing  Flowsheets (Taken 2/6/2023 0800)  Absence of Physical Injury: Implement safety measures based on patient assessment  2/6/2023 0507 by Willam Shaw RN  Outcome: Progressing     Problem: Safety - Adult  Goal: Free from fall injury  2/6/2023 1831 by Lisa Yeh RN  Outcome: Progressing  Flowsheets (Taken 2/6/2023 0800)  Free From Fall Injury: Based on caregiver fall risk screen, instruct family/caregiver to ask for assistance with transferring infant if caregiver noted to have fall risk factors  2/6/2023 0507 by Willam Shaw RN  Outcome: Progressing     Problem: Chronic Conditions and Co-morbidities  Goal: Patient's chronic conditions and co-morbidity symptoms are monitored and maintained or improved  Outcome: Progressing  Flowsheets (Taken 2/6/2023 0800)  Care Plan - Patient's Chronic Conditions and Co-Morbidity Symptoms are Monitored and Maintained or Improved: Monitor and assess patient's chronic conditions and comorbid symptoms for stability, deterioration, or improvement

## 2023-02-06 NOTE — PROGRESS NOTES
Speech Language Pathology  Speech Language Pathology  [id number] [facility]    Cognitive Treatment Note    Date: 2/6/2023  Patients Name: Phillip Lucero  MRN: [id number]  Diagnosis:   Patient Active Problem List   Diagnosis Code    Acute ischemic stroke [facility]) I63.9    ICAO (internal carotid artery occlusion), right I65.21    Left-sided weakness R53.1       Pain: Pt does not complain of pain. Cognitive Treatment    Treatment time: 10:00- 10:23      Subjective: [x] Alert [x] Cooperative     [] Confused     [] Agitated    [] Lethargic      Objective/Assessment:    Recall: Delayed recall of 3 units 6/6. Delayed recall of 4 units 9/12, increasing to 12/12 with minimal verbal cueing. Problem Solving/Reasoning: State the opposites 9/10, increasing to 10/10 with minimal verbal cueing. Stating situational problems 10/10. Multiple definitions 20/20. Add to the category concrete 20/20. Plan:  [x] Continue ST services    [] Discharge from ST:      Discharge recommendations: []  Further therapy recommended at discharge. The patient should be able to tolerate at least 3 hours of therapy per day over 5 days or 15 hours over 7 days. [x] Further therapy recommended at discharge. [] No therapy recommended at discharge. Treatment completed by: Completed by: Bronwyn Manjarrez  Clinician    Cosigned By: Afua Moore S.CCC/SLP

## 2023-02-06 NOTE — CONSULTS
Physical Medicine & Rehabilitation  Consult Note      Admitting Physician:  Justin Vyas MD    Primary Care Provider:  Wale Lopez MD     Reason for Consult:  Acute Inpatient Rehabilitation    Chief Complaint:  Left-sided numbness and weakness    History of Present Illness:  Referring Provider is requesting an evaluation for appropriate placement upon discharge from acute care. History from chart review and patient. Ron Jean is a 75 y.o. male with history of HTN admitted to [facility] on 2/4/2023. He initially presented with left-sided numbness and weakness for a few hours. NIHSS 2 with progression to NIHSS 8 - improved with IV fluid. CTA head/neck showed possible dissection versus thrombus in the right carotid artery. He was started on a heparin drip. MRI brain showed subacute ischemia involving the right parietal lobe. He underwent diagnostic cerebral angiogram on 2/6/23, which showed right KAYLI supraclinoid segment stenosis. He reports ongoing left-sided weakness and numbness. He denies any other acute concerns. Review of Systems:  Review of Systems   Constitutional:  Negative for fever. Respiratory:  Negative for shortness of breath. Cardiovascular:  Negative for chest pain. Gastrointestinal:  Negative for abdominal pain. Musculoskeletal:  Negative for back pain. Neurological:  Positive for sensory change and weakness. Negative for headaches. Premorbid function:  Independent    Current function:    Physical Therapy                          Occupational Therapy                                              Speech Therapy  Assessment:  Cognitive Diagnosis: Pt presented with mild cognitive-communication deficits characterized by difficulty with delayed recall and verbal reasoning tasks. Pt. Presents with no dysarthria, no O/M deficits at this time. ST to follow up and provide treatment to address noted deficits. Education provided.   ST recommends 3-5x/week at this time.     Pt reported the skill he feels has been the most difficult has been his recall. He also reports although he has been feeling better, he feels \"slow\". Past Medical History:        Diagnosis Date    HTN (hypertension)        Past Surgical History:    No past surgical history on file.     Allergies:    No Known Allergies     Current Medications:   Current Facility-Administered Medications: nitroPRUSSide (NIPRIDE) injection 200 mcg, 200 mcg, IntraVENous, Once  sodium chloride flush 0.9 % injection 5-40 mL, 5-40 mL, IntraVENous, 2 times per day  sodium chloride flush 0.9 % injection 5-40 mL, 5-40 mL, IntraVENous, PRN  0.9 % sodium chloride infusion, , IntraVENous, PRN  acetaminophen (TYLENOL) tablet 650 mg, 650 mg, Oral, Q4H PRN  ondansetron (ZOFRAN-ODT) disintegrating tablet 4 mg, 4 mg, Oral, Q8H PRN **OR** ondansetron (ZOFRAN) injection 4 mg, 4 mg, IntraVENous, Q6H PRN  midodrine (PROAMATINE) tablet 10 mg, 10 mg, Oral, TID  DOPamine (INTROPIN) 400 mg in dextrose 5 % 250 mL infusion, 1-20 mcg/kg/min, IntraVENous, Continuous  atorvastatin (LIPITOR) tablet 80 mg, 80 mg, Oral, Nightly  sodium chloride flush 0.9 % injection 5-40 mL, 5-40 mL, IntraVENous, PRN  0.9 % sodium chloride infusion, , IntraVENous, PRN  aspirin chewable tablet 81 mg, 81 mg, Oral, Daily  ticagrelor (BRILINTA) tablet 90 mg, 90 mg, Oral, BID  ondansetron (ZOFRAN-ODT) disintegrating tablet 4 mg, 4 mg, Oral, Q8H PRN **OR** ondansetron (ZOFRAN) injection 4 mg, 4 mg, IntraVENous, Q6H PRN  polyethylene glycol (GLYCOLAX) packet 17 g, 17 g, Oral, Daily PRN  perflutren lipid microspheres (DEFINITY) injection 1.5 mL, 1.5 mL, IntraVENous, ONCE PRN  acetaminophen (TYLENOL) tablet 650 mg, 650 mg, Oral, Q4H PRN **OR** acetaminophen (TYLENOL) suppository 650 mg, 650 mg, Rectal, Q4H PRN  heparin (porcine) 25,000 Units in sodium chloride 0.9 % 250 mL infusion, 5-30 Units/kg/hr, IntraVENous, Continuous  0.9 % sodium chloride infusion, , IntraVENous, Continuous    Family History:   No family history on file. Social History:  Lives alone  Home setup:   Floors in home:  1. Bed/bathroom on floor:  main. Steps into home:  3. Social History     Socioeconomic History    Marital status: Unknown       Physical Exam:  BP (!) 108/58   Pulse 51   Temp 97.9 °F (36.6 °C)   Resp 13   Ht 5' 7\" (1.702 m)   Wt 170 lb (77.1 kg)   SpO2 97%   BMI 26.63 kg/m²     GEN: Well developed, well nourished, no acute distress  HEENT: NCAT. EOMI. Hearing grossly intact. Mucous membranes pink and moist.  RESP: Normal breath sounds with no wheezing, rales, or rhonchi. Respirations WNL and unlabored. CV: Regular rate and rhythm. No murmurs, rubs, or gallops. ABD: Soft, non-distended, BS+ and equal.  NEURO: Alert. Speech fluent. No facial droop. Symmetrical shoulder shrug. Midline tongue protrusion. Sensation to light touch decreased in the left upper and lower limbs. MSK: Muscle tone and bulk are normal bilaterally. Strength 4+/5 in the right upper limb. Strength 4/5 in the left upper limb. Strength 4+/5 with bilateral ankle dorsiflexion and plantarflexion. LIMBS: No edema in bilateral lower limbs. SKIN: Warm and dry with good turgor. PSYCH: Mood WNL. Affect WNL. Appropriately interactive. Diagnostics:    CBC:   Recent Labs     02/05/23  0600 02/05/23  1045 02/06/23  0703   WBC 10.0 9.7 8.3   RBC 4.30 4.37 4.26   HGB 13.6 13.7 13.5   HCT 39.8* 40.0* 38.5*   MCV 92.6 91.5 90.4   RDW 11.2* 11.3* 11.4*    235 236     BMP:   Recent Labs     02/04/23  1322 02/05/23  1045 02/06/23  0938    135 136   K 4.6 3.7 4.0    105 104   CO2 22 19* 20   BUN 19 10 8   CREATININE 1.07 0.80 0.82   GLUCOSE 104* 168* 135*      HbA1c:   Lab Results   Component Value Date    LABA1C 4.8 02/05/2023     BNP: No results for input(s): BNP in the last 72 hours.   PT/INR:   Recent Labs     02/04/23  1322   PROTIME 11.1   INR 1.0     APTT:   Recent Labs     02/06/23  0009 02/06/23  0703 02/06/23  1208   APTT 53.1* 54.8* 55.1*     CARDIAC ENZYMES: No results for input(s): CKMB, CKMBINDEX, TROPONINT in the last 72 hours. Invalid input(s): CKTOTAL;3  FASTING LIPID PANEL:  Lab Results   Component Value Date    CHOL 217 (H) 02/05/2023    HDL 51 02/05/2023    TRIG 99 02/05/2023     LIVER PROFILE: No results for input(s): AST, ALT, ALB, BILIDIR, BILITOT, ALKPHOS in the last 72 hours. Radiology:  CT HEAD WO CONTRAST    Result Date: 2/4/2023  EXAMINATION: CT OF THE HEAD WITHOUT CONTRAST  2/4/2023 1:00 pm TECHNIQUE: CT of the head was performed without the administration of intravenous contrast. Automated exposure control, iterative reconstruction, and/or weight based adjustment of the mA/kV was utilized to reduce the radiation dose to as low as reasonably achievable. COMPARISON: None. HISTORY: ORDERING SYSTEM PROVIDED HISTORY: stroke alert, left sided TECHNOLOGIST PROVIDED HISTORY: stroke alert, left sided Decision Support Exception - unselect if not a suspected or confirmed emergency medical condition->Emergency Medical Condition (MA) FINDINGS: BRAIN/VENTRICLES: There is no acute intracranial hemorrhage, mass effect, or midline shift. There is a remote left occipital lobe infarct. The ventricles are symmetric. The infratentorial structures are unremarkable. ORBITS: The visualized portion of the orbits demonstrate no acute abnormality. SINUSES: The visualized paranasal sinuses and mastoid air cells demonstrate no acute abnormality. SOFT TISSUES/SKULL:  No acute abnormality of the visualized skull or soft tissues. No acute intracranial abnormality. Remote left occipital lobe infarct. Findings were discussed with Dr. Joanna Peguero At 1:28 pm on 2/4/2023. XR CHEST PORTABLE    Result Date: 2/4/2023  EXAMINATION: ONE XRAY VIEW OF THE CHEST 2/4/2023 1:24 pm COMPARISON: None.  HISTORY: ORDERING SYSTEM PROVIDED HISTORY: stroke alert, dizziness TECHNOLOGIST PROVIDED HISTORY: stroke alert, dizziness FINDINGS: There is no acute consolidation or effusion. There is no pneumothorax. The mediastinal structures are unremarkable. The upper abdomen is unremarkable. The extrathoracic soft tissues are unremarkable. There is no acute osseous abnormality. No acute cardiopulmonary process. CTA HEAD NECK W CONTRAST    Result Date: 2/6/2023  EXAMINATION: CTA OF THE HEAD AND NECK WITH CONTRAST 2/6/2023 5:05 am: TECHNIQUE: CTA of the head and neck was performed with the administration of intravenous contrast. Multiplanar reformatted images are provided for review. MIP images are provided for review. Stenosis of the internal carotid arteries measured using NASCET criteria. Automated exposure control, iterative reconstruction, and/or weight based adjustment of the mA/kV was utilized to reduce the radiation dose to as low as reasonably achievable. COMPARISON: CTA of neck and head on 02/04/2023. MRI of brain on 02/05/2023. HISTORY: ORDERING SYSTEM PROVIDED HISTORY: R ICA occlusion, need to r/o worsening compare to the one on 2/4/23 TECHNOLOGIST PROVIDED HISTORY: R ICA occlusion, need to r/o worsening compare to the one on 2/4/23 FINDINGS: CTA NECK: AORTIC ARCH/ARCH VESSELS: No dissection or arterial injury. No significant stenosis of the brachiocephalic or subclavian arteries. CAROTID ARTERIES: No evidence of stenosis or dissection or compromise in the left common carotid artery, left internal carotid artery and left external carotid artery in the neck. On the previous CTA on 02/04/2023, there was evidence of severe stenosis of the upper portion of the cervical segment of right ICA and just proximal to the petrous segment there was complete occlusion of the right ICA. There are also occlusion of the right ICA through the petrous segment, the cavernous segment and paraclinoid segment.   There was reconstitution of the upper and of the supraclinoid segment of right ICA and the right middle cerebral artery via collateral circulation from the left ICA through anterior cerebral-anterior communicating artery connection. On the current CT study the mid and upper portion of the cervical segment of the right ICA and appears to be patent without significant stenosis. The petrous segment of right ICA is also patent and opacified. The precavernous segment and cavernous segment as well as the paraclinoid segment of right ICA are patent and opacified. However, there is still moderate to severe stenosis in the upper portion of the cavernous segment. VERTEBRAL ARTERIES: No dissection, arterial injury, or significant stenosis. SOFT TISSUES: The lung apices are clear. No cervical or superior mediastinal lymphadenopathy. The larynx and pharynx are unremarkable. No acute abnormality of the salivary and thyroid glands. BONES: No acute osseous abnormality. CTA HEAD: ANTERIOR CIRCULATION: In the upper cervical segment of right internal carotid artery, the petrous segment, precavernous segment and proximal cavernous segment, there is no obvious stenosis at this time. But there is evidence of moderate to severe stenosis involving the upper portion of the cavernous segment and the paraclinoid segment of right internal carotid artery. The supraclinoid segment of right internal carotid artery is patent without obvious stenosis. The right middle cerebral artery and branches are normally patent. In the area of the right parietal cortical infarction the branches of right middle cerebral artery are identifiable. The anterior cerebral arteries of both sides are normally patent. The left middle cerebral artery and branches are normally patent. POSTERIOR CIRCULATION: No significant stenosis of the vertebral, basilar, or posterior cerebral arteries. In the area of old infarction of left occipital lobe, the branches of left posterior cerebral artery are not visualized. No aneurysm.  OTHER: No dural venous sinus thrombosis on this non-dedicated study. BRAIN: No mass effect or midline shift. No extra-axial fluid collection. Evidence of focal low attenuation involving cerebral sulci in the lateral portion of the right parietal lobe corresponding to the areas of acute cortical infarction as visualized on previous MRI study on 02/05/2023. Redemonstration of old infarction with encephalomalacia involving the left occipital lobe. Since last CTA study on 02/04/2023, the occlusion of the upper portion of the cervical segment, petrous segment, precavernous segment and proximal cavernous segment of the right ICA appear to be resolved without significant stenosis, but still there appears to be moderate to severe stenosis involving the upper portion of the cavernous segment of right ICA and the paraclinoid segment of right ICA. The supraclinoid segment of right ICA is patent without significant stenosis at this time. In the neck, rest of common carotid and internal carotid arteries are patent without stenosis. Bilateral vertebral arteries are patent without stenosis or dissection. The right middle cerebral artery and its major branches are patent and opacified. Bilateral anterior cerebral arteries and the left middle cerebral artery and branches are normally patent. No evidence of compromise in the vertebrobasilar arterial circulation except for the area of old infarction involving the left occipital lobe. Evidence of low attenuation, corresponding to acute cortical infarction in the lateral portion of right parietal lobe. But multiple small branches of the right MCA are identifiable in the areas of cortical infarction. Redemonstration of prominent large old infarction and encephalomalacia involving the left occipital lobe.      CTA HEAD NECK W CONTRAST    Result Date: 2/4/2023  EXAMINATION: CTA OF THE HEAD AND NECK WITH CONTRAST 2/4/2023 1:00 pm: TECHNIQUE: CTA of the head and neck was performed with the administration of intravenous contrast. Multiplanar reformatted images are provided for review. MIP images are provided for review. Stenosis of the internal carotid arteries measured using NASCET criteria. Automated exposure control, iterative reconstruction, and/or weight based adjustment of the mA/kV was utilized to reduce the radiation dose to as low as reasonably achievable. COMPARISON: None. HISTORY: ORDERING SYSTEM PROVIDED HISTORY: stroke alert left sided TECHNOLOGIST PROVIDED HISTORY: stroke alert left sided Decision Support Exception - unselect if not a suspected or confirmed emergency medical condition->Emergency Medical Condition (MA) FINDINGS: CTA NECK: AORTIC ARCH/ARCH VESSELS: No dissection or arterial injury. No significant stenosis of the brachiocephalic or subclavian arteries. CAROTID ARTERIES: The common carotid arteries are patent. The cervical portion of the left internal carotid artery is patent. There is a subtle linear hypodense focus in the proximal aspect of the right internal carotid artery with subsequent tapering of flow to minimal to no flow within the mid and distal right cervical internal carotid artery VERTEBRAL ARTERIES: No dissection, arterial injury, or significant stenosis. SOFT TISSUES: The lung apices are clear. No cervical or superior mediastinal lymphadenopathy. The larynx and pharynx are unremarkable. No acute abnormality of the salivary and thyroid glands. BONES: No acute osseous abnormality. CTA HEAD: ANTERIOR CIRCULATION: No significant flow is seen in the right internal carotid artery. The left internal carotid artery is patent. The anterior cerebral arteries and middle cerebral arteries are patent. POSTERIOR CIRCULATION: No significant stenosis of the vertebral, basilar, or posterior cerebral arteries. No aneurysm. OTHER: No dural venous sinus thrombosis on this non-dedicated study. BRAIN: No mass effect or midline shift. No extra-axial fluid collection. The gray-white differentiation is maintained.      Subtle linear hypodense focus in the proximal aspect of the right internal carotid artery with subsequent tapering of flow, from minimal to no flow, in the mid and distal right cervical internal carotid artery concerning for a dissection. Alternatively this may be the result of a right carotid terminus thrombus. Angiography may be helpful in further characterization. No significant flow within the intracranial portion of the right internal carotid artery. No significant stenosis or occlusion within the remainder of the intracranial vessels. Findings were discussed with Dr. Bela Kent At 1:37 pm on 2/4/2023. MRI brain without contrast    Result Date: 2/5/2023  EXAMINATION: MRI OF THE BRAIN WITHOUT CONTRAST  2/5/2023 12:23 pm TECHNIQUE: Multiplanar multisequence MRI of the brain was performed without the administration of intravenous contrast. COMPARISON: CT brain performed 02/04/2023. HISTORY: ORDERING SYSTEM PROVIDED HISTORY: acute ischemic stroke TECHNOLOGIST PROVIDED HISTORY: acute ischemic stroke What is the sedation requirement?->None Reason for Exam: acute ischemic stroke FINDINGS: INTRACRANIAL STRUCTURES/VENTRICLES: The sellar and suprasellar structures, optic chiasm, corpus callosum, pineal gland, tectum, and midline brainstem structures are unremarkable. The craniocervical junction is unremarkable. There is no acute hemorrhage, mass effect, or midline shift. There is satisfactory overall gray-white matter differentiation. There is chronic microvascular disease. There is remote left occipital lobe infarct. The ventricular structures are symmetric and unremarkable. The infratentorial structures including the cerebellopontine angles and internal auditory canals are unremarkable. There is restricted diffusion and associated FLAIR signal abnormality in the right parietal lobe. There is no abnormal blooming artifact on susceptibility weighted imaging.  ORBITS: The visualized portion of the orbits demonstrate no acute abnormality. SINUSES: The visualized paranasal sinuses and mastoid air cells demonstrate no acute abnormality. BONES/SOFT TISSUES: The bone marrow signal intensity appears normal. The soft tissues demonstrate no acute abnormality. Subacute ischemia involving the right parietal lobe. Underlying chronic microvascular disease with remote left occipital lobe infarct. Impression:    Right parietal CVA  Left hemiparesis  Mild impairment of cognition  HTN    Recommendations:    Diagnosis:  Right parietal CVA  Therapy: Has SLP needs, PT/OT evaluations pending  Medical Necessity: As above  Support: Lives alone  Rehab Recommendation: Will follow up once PT and OT evaluations are completed. DVT Prophylaxis: On heparin drip      It was my pleasure to evaluate Rico Pi today. Please call with questions.     Yaa Rivera MD

## 2023-02-06 NOTE — CARE COORDINATION
Met with pt to assess for support and needs. Son Briseyda Duran at bedside and pt agreeable to son staying for assessment. Pt states that he lives alone and was independent prior to admission. He is currently employed by [employer] as a . Pt states he has a strong family support system and denies depression/SI. Patient Health Questionnaire-9 (PHQ-9)    Over the last 2 weeks, how often have you been bothered by any of the following problems? 1. Little Interest or pleasure in doing things? [x] Not at all  [] Several Days  [] More than half the day  []  Nearly every day    2. Feeling down, depressed or hopeless? [x] Not at all  [] Several Days  [] More than half the day  []  Nearly every day    3. Trouble falling or staying asleep, or sleeping too much? [x] Not at all  [] Several Days  [] More than half the day  []  Nearly every day    4. Feeling tired or having little energy? [x] Not at all  [] Several Days  [] More than half the day  []  Nearly every day    5. Poor apettite or overeating? [x] Not at all  [] Several Days  [] More than half the day  []  Nearly every day    6. Feeling bad about yourself-or that you are a failure or have let yourself or your family down? [x] Not at all  [] Several Days  [] More than half the day  []  Nearly every day    7. Trouble concentrating on things, such as reading the newspaper or watching television? [x] Not at all  [] Several Days  [] More than half the day  []  Nearly every day    8. Moving or speaking so slowly that other people could have noticed? Or the opposite-being so fidgety or restless that you have been moving around a lot more than usual?   [x] Not at all  [] Several Days  [] More than half the day  []  Nearly every day    9. Thoughts that you would be better off dead or of hurting yourself in some way?    [x] Not at all  [] Several Days  [] More than half the day  []  Nearly every day    Total Score: 0    If you checked off any problems, how difficult have these problems made it for you to do your work, take care of things at home, or get along with other people?    [] Not difficult at all  [] Somewhat Difficult  [] Very Difficult  []  Extremely Difficult

## 2023-02-06 NOTE — PROGRESS NOTES
[facility] ([facility])  Occupational Therapy Not Seen Note    DATE: 2023    NAME: Marilia Blount  MRN: [id number]   : 1950      Patient not seen this date for Occupational Therapy due to: Other: Possible stent placement today. Will check back tomorrow and evaluate as able.     Next Scheduled Treatment:       Electronically signed by JOSELIN Flannery on 2023 at 2:31 PM

## 2023-02-07 LAB
ABSOLUTE EOS #: 0.09 K/UL (ref 0–0.44)
ABSOLUTE IMMATURE GRANULOCYTE: 0.03 K/UL (ref 0–0.3)
ABSOLUTE LYMPH #: 1.4 K/UL (ref 1.1–3.7)
ABSOLUTE MONO #: 0.81 K/UL (ref 0.1–1.2)
ANION GAP SERPL CALCULATED.3IONS-SCNC: 12 MMOL/L (ref 9–17)
BASOPHILS # BLD: 0 % (ref 0–2)
BASOPHILS ABSOLUTE: <0.03 K/UL (ref 0–0.2)
BUN SERPL-MCNC: 15 MG/DL (ref 8–23)
CALCIUM SERPL-MCNC: 8.6 MG/DL (ref 8.6–10.4)
CHLORIDE SERPL-SCNC: 109 MMOL/L (ref 98–107)
CO2 SERPL-SCNC: 17 MMOL/L (ref 20–31)
CREAT SERPL-MCNC: 1.01 MG/DL (ref 0.7–1.2)
EKG ATRIAL RATE: 72 BPM
EKG P-R INTERVAL: 142 MS
EKG Q-T INTERVAL: 344 MS
EKG QRS DURATION: 96 MS
EKG QTC CALCULATION (BAZETT): 376 MS
EKG R AXIS: -13 DEGREES
EKG T AXIS: 42 DEGREES
EKG VENTRICULAR RATE: 72 BPM
EOSINOPHILS RELATIVE PERCENT: 1 % (ref 1–4)
GFR SERPL CREATININE-BSD FRML MDRD: >60 ML/MIN/1.73M2
GLUCOSE SERPL-MCNC: 82 MG/DL (ref 70–99)
HCT VFR BLD AUTO: 35.3 % (ref 40.7–50.3)
HGB BLD-MCNC: 11.9 G/DL (ref 13–17)
IMMATURE GRANULOCYTES: 0 %
LYMPHOCYTES # BLD: 18 % (ref 24–43)
MCH RBC QN AUTO: 31.3 PG (ref 25.2–33.5)
MCHC RBC AUTO-ENTMCNC: 33.7 G/DL (ref 28.4–34.8)
MCV RBC AUTO: 92.9 FL (ref 82.6–102.9)
MONOCYTES # BLD: 10 % (ref 3–12)
NRBC AUTOMATED: 0.2 PER 100 WBC
PARTIAL THROMBOPLASTIN TIME: 35.4 SEC (ref 20.5–30.5)
PARTIAL THROMBOPLASTIN TIME: 59.5 SEC (ref 20.5–30.5)
PARTIAL THROMBOPLASTIN TIME: 65.9 SEC (ref 20.5–30.5)
PARTIAL THROMBOPLASTIN TIME: 70.3 SEC (ref 20.5–30.5)
PDW BLD-RTO: 11.5 % (ref 11.8–14.4)
PLATELET # BLD AUTO: 195 K/UL (ref 138–453)
PMV BLD AUTO: 10 FL (ref 8.1–13.5)
POTASSIUM SERPL-SCNC: 4.1 MMOL/L (ref 3.7–5.3)
RBC # BLD: 3.8 M/UL (ref 4.21–5.77)
SEG NEUTROPHILS: 71 % (ref 36–65)
SEGMENTED NEUTROPHILS ABSOLUTE COUNT: 5.66 K/UL (ref 1.5–8.1)
SODIUM SERPL-SCNC: 138 MMOL/L (ref 135–144)
WBC # BLD AUTO: 8 K/UL (ref 3.5–11.3)

## 2023-02-07 PROCEDURE — 97530 THERAPEUTIC ACTIVITIES: CPT

## 2023-02-07 PROCEDURE — 6370000000 HC RX 637 (ALT 250 FOR IP)

## 2023-02-07 PROCEDURE — 2580000003 HC RX 258: Performed by: FAMILY MEDICINE

## 2023-02-07 PROCEDURE — 2000000000 HC ICU R&B

## 2023-02-07 PROCEDURE — 80048 BASIC METABOLIC PNL TOTAL CA: CPT

## 2023-02-07 PROCEDURE — 97166 OT EVAL MOD COMPLEX 45 MIN: CPT

## 2023-02-07 PROCEDURE — 6370000000 HC RX 637 (ALT 250 FOR IP): Performed by: STUDENT IN AN ORGANIZED HEALTH CARE EDUCATION/TRAINING PROGRAM

## 2023-02-07 PROCEDURE — 97161 PT EVAL LOW COMPLEX 20 MIN: CPT

## 2023-02-07 PROCEDURE — 97129 THER IVNTJ 1ST 15 MIN: CPT

## 2023-02-07 PROCEDURE — 97535 SELF CARE MNGMENT TRAINING: CPT

## 2023-02-07 PROCEDURE — 99231 SBSQ HOSP IP/OBS SF/LOW 25: CPT | Performed by: PSYCHIATRY & NEUROLOGY

## 2023-02-07 PROCEDURE — 99232 SBSQ HOSP IP/OBS MODERATE 35: CPT | Performed by: STUDENT IN AN ORGANIZED HEALTH CARE EDUCATION/TRAINING PROGRAM

## 2023-02-07 PROCEDURE — 85730 THROMBOPLASTIN TIME PARTIAL: CPT

## 2023-02-07 PROCEDURE — 94761 N-INVAS EAR/PLS OXIMETRY MLT: CPT

## 2023-02-07 PROCEDURE — 97130 THER IVNTJ EA ADDL 15 MIN: CPT

## 2023-02-07 PROCEDURE — 85025 COMPLETE CBC W/AUTO DIFF WBC: CPT

## 2023-02-07 PROCEDURE — 2580000003 HC RX 258: Performed by: PSYCHIATRY & NEUROLOGY

## 2023-02-07 PROCEDURE — 6370000000 HC RX 637 (ALT 250 FOR IP): Performed by: PSYCHIATRY & NEUROLOGY

## 2023-02-07 PROCEDURE — 2580000003 HC RX 258: Performed by: STUDENT IN AN ORGANIZED HEALTH CARE EDUCATION/TRAINING PROGRAM

## 2023-02-07 PROCEDURE — 36415 COLL VENOUS BLD VENIPUNCTURE: CPT

## 2023-02-07 PROCEDURE — 6360000002 HC RX W HCPCS: Performed by: FAMILY MEDICINE

## 2023-02-07 RX ORDER — LANOLIN ALCOHOL/MO/W.PET/CERES
325 CREAM (GRAM) TOPICAL
Status: DISCONTINUED | OUTPATIENT
Start: 2023-02-07 | End: 2023-02-07

## 2023-02-07 RX ORDER — 0.9 % SODIUM CHLORIDE 0.9 %
250 INTRAVENOUS SOLUTION INTRAVENOUS ONCE
Status: DISCONTINUED | OUTPATIENT
Start: 2023-02-07 | End: 2023-02-09 | Stop reason: HOSPADM

## 2023-02-07 RX ORDER — LANOLIN ALCOHOL/MO/W.PET/CERES
325 CREAM (GRAM) TOPICAL
Status: DISCONTINUED | OUTPATIENT
Start: 2023-02-07 | End: 2023-02-09 | Stop reason: HOSPADM

## 2023-02-07 RX ADMIN — FERROUS SULFATE TAB EC 325 MG (65 MG FE EQUIVALENT) 325 MG: 325 (65 FE) TABLET DELAYED RESPONSE at 16:54

## 2023-02-07 RX ADMIN — TICAGRELOR 90 MG: 90 TABLET ORAL at 20:03

## 2023-02-07 RX ADMIN — SODIUM CHLORIDE, PRESERVATIVE FREE 10 ML: 5 INJECTION INTRAVENOUS at 08:41

## 2023-02-07 RX ADMIN — MIDODRINE HYDROCHLORIDE 10 MG: 5 TABLET ORAL at 20:03

## 2023-02-07 RX ADMIN — MIDODRINE HYDROCHLORIDE 10 MG: 5 TABLET ORAL at 15:43

## 2023-02-07 RX ADMIN — ACETAMINOPHEN 650 MG: 325 TABLET ORAL at 03:47

## 2023-02-07 RX ADMIN — SODIUM CHLORIDE, PRESERVATIVE FREE 30 ML: 5 INJECTION INTRAVENOUS at 20:10

## 2023-02-07 RX ADMIN — MIDODRINE HYDROCHLORIDE 10 MG: 5 TABLET ORAL at 08:41

## 2023-02-07 RX ADMIN — SODIUM CHLORIDE: 9 INJECTION, SOLUTION INTRAVENOUS at 03:47

## 2023-02-07 RX ADMIN — ASPIRIN 81 MG: 81 TABLET, CHEWABLE ORAL at 08:41

## 2023-02-07 RX ADMIN — POLYETHYLENE GLYCOL 3350 17 G: 17 POWDER, FOR SOLUTION ORAL at 08:41

## 2023-02-07 RX ADMIN — Medication 250 ML: at 01:22

## 2023-02-07 RX ADMIN — ATORVASTATIN CALCIUM 80 MG: 80 TABLET, FILM COATED ORAL at 20:03

## 2023-02-07 RX ADMIN — MIDODRINE HYDROCHLORIDE 5 MG: 5 TABLET ORAL at 00:04

## 2023-02-07 RX ADMIN — TICAGRELOR 90 MG: 90 TABLET ORAL at 08:41

## 2023-02-07 RX ADMIN — HEPARIN SODIUM 17 UNITS/KG/HR: 5000 INJECTION, SOLUTION INTRAVENOUS; SUBCUTANEOUS at 07:18

## 2023-02-07 ASSESSMENT — PAIN DESCRIPTION - LOCATION: LOCATION: SHOULDER

## 2023-02-07 ASSESSMENT — PAIN SCALES - GENERAL
PAINLEVEL_OUTOF10: 2
PAINLEVEL_OUTOF10: 3

## 2023-02-07 ASSESSMENT — ENCOUNTER SYMPTOMS
SHORTNESS OF BREATH: 0
ABDOMINAL PAIN: 0

## 2023-02-07 ASSESSMENT — PAIN DESCRIPTION - ORIENTATION: ORIENTATION: RIGHT

## 2023-02-07 ASSESSMENT — PAIN DESCRIPTION - DESCRIPTORS: DESCRIPTORS: ACHING

## 2023-02-07 NOTE — PLAN OF CARE
Problem: Discharge Planning  Goal: Discharge to home or other facility with appropriate resources  2/7/2023 0729 by Jade Rich RN  Outcome: Progressing     Problem: Skin/Tissue Integrity  Goal: Absence of new skin breakdown  Description: 1. Monitor for areas of redness and/or skin breakdown  2. Assess vascular access sites hourly  3. Every 4-6 hours minimum:  Change oxygen saturation probe site  4. Every 4-6 hours:  If on nasal continuous positive airway pressure, respiratory therapy assess nares and determine need for appliance change or resting period.   2/7/2023 0729 by Jade Rich RN  Outcome: Progressing     Problem: ABCDS Injury Assessment  Goal: Absence of physical injury  2/7/2023 0729 by Jade Rich RN  Outcome: Progressing     Problem: Safety - Adult  Goal: Free from fall injury  2/7/2023 0729 by Jade Rich RN  Outcome: Progressing     Problem: Chronic Conditions and Co-morbidities  Goal: Patient's chronic conditions and co-morbidity symptoms are monitored and maintained or improved  2/7/2023 0729 by Jade Rich RN  Outcome: Progressing     Problem: Pain  Goal: Verbalizes/displays adequate comfort level or baseline comfort level  Outcome: Progressing

## 2023-02-07 NOTE — PROGRESS NOTES
In order for patient to reach systolic blood pressure goal of 120-180 Dr. Keenan Clancy ordered 5mg of midodrine. The pt last bp at 2330  was 116/68 (82).

## 2023-02-07 NOTE — PROGRESS NOTES
Per Dr. Bro Loera, 250ml bolus given for the patient due to pt blood pressure being outside of the parameters of 120-160. Per Dr. Bro Loera notify if systolic bp is below 746, wean off of dopamine, and try to maintain BP with fluids. 12- Dr Bro Loera notified of HR sustaining in the 40's. Per Dr Bro Loera no intervention is needed at this time if the patient is not having symptoms.

## 2023-02-07 NOTE — PROGRESS NOTES
Physical Therapy  Facility/Department: [address] NEURO ICU  Physical Therapy Initial Assessment    Name: Cristian Blair  : 1950  MRN: [id number]  Date of Service: 2023  Chief Complaint   Patient presents with    Cerebrovascular Accident       Discharge Recommendations:    Further therapy recommended at discharge. PT Equipment Recommendations  Equipment Needed: Yes  Mobility Devices: Wallie Munch: Rolling  Other: Pt stated that it might be beneficial for mobility and safety if he were to aquire a walker for home. Patient Diagnosis(es): The encounter diagnosis was Left-sided weakness. Past Medical History:  has a past medical history of HTN (hypertension). Past Surgical History:  has no past surgical history on file. Assessment   Body Structures, Functions, Activity Limitations Requiring Skilled Therapeutic Intervention: Decreased functional mobility ; Decreased strength;Decreased endurance;Decreased balance  Assessment: Pt amb 260' CGA w/ RW, does not use an AD at baseline. Pt would benefit from continued acute PT to address deficits.   Therapy Prognosis: Fair  Decision Making: Low Complexity  Requires PT Follow-Up: Yes  Activity Tolerance  Activity Tolerance: Patient tolerated treatment well     Plan   Physcial Therapy Plan  General Plan:  (5-6x/wk)  Current Treatment Recommendations: Strengthening, Balance training, Functional mobility training, Transfer training, Endurance training, Gait training, Stair training, Neuromuscular re-education, Home exercise program, Safety education & training, Patient/Caregiver education & training, Equipment evaluation, education, & procurement, Therapeutic activities  Safety Devices  Type of Devices: Call light within reach, Gait belt, Patient at risk for falls, Left in bed, Nurse notified (son at bedside)  Restraints  Restraints Initially in Place: No     Restrictions  Restrictions/Precautions  Restrictions/Precautions: Up as Tolerated, General Precautions  Required Braces or Orthoses?: No  Position Activity Restriction  Other position/activity restrictions: 10lb lifting restriction until 2/8 at 1630. -180 mmHg. DSA 2/6. Subjective   General  Chart Reviewed: Yes  Patient assessed for rehabilitation services?: Yes  Response To Previous Treatment: Not applicable  Family / Caregiver Present: Yes (Son)  Follows Commands: Within Functional Limits  General Comment  Comments: RN and pt agreeable to PT. pt alert in bed upon arrival. Co-Eval W/ OT. Subjective  Subjective: Pt denies pain, n/t         Social/Functional History  Social/Functional History  Lives With: Alone  Type of Home: House  Home Layout: One level  Home Access: Stairs to enter with rails  Entrance Stairs - Number of Steps: 3  Entrance Stairs - Rails: Both  Bathroom Shower/Tub: Tub/Shower unit  Bathroom Toilet: Standard  Bathroom Equipment: Grab bars in shower (non-slip mat)  Bathroom Accessibility: Accessible  Has the patient had two or more falls in the past year or any fall with injury in the past year?: No  Receives Help From: Family  ADL Assistance: [address] [place] [address] Avenue: Independent  Homemaking Responsibilities: Yes  Ambulation Assistance: Independent  Transfer Assistance: Independent  Active : Yes  Mode of Transportation: Car, Truck  Occupation: Part time employment (\"semi-retired\")  Type of Occupation: [employer] at [employer]  Additional Comments: Pt reports 2 Retired sisters nearby, Son will be in town for 2 weeks to help out.  24/7 assist  Vision/Hearing  Vision  Vision Exceptions: Wears glasses at all times  Hearing  Hearing: Within functional limits    Cognition   Orientation  Overall Orientation Status: Within Functional Limits  Cognition  Overall Cognitive Status: WFL     Objective   AROM RLE (degrees)  RLE AROM: WFL  AROM LLE (degrees)  LLE AROM : WFL  AROM RUE (degrees)  RUE General AROM: See OT  AROM LUE (degrees)  LUE General AROM: See OT  Strength RLE  Strength RLE: WFL  Comment: pt grossly 5/5  Strength LLE  Strength LLE: WFL  Comment: pt grossly 5/5  Strength RUE  Comment: See OT  Strength LUE  Comment: See OT        Bed Mobility Training  Bed Mobility Training: Yes  Overall Level of Assistance: Stand-by assistance  Interventions: Verbal cues  Supine to Sit: Stand-by assistance  Sit to Supine: Stand-by assistance  Scooting: Stand-by assistance  Balance  Sitting: Without support (SBA EOB)  Standing: With support (CGA w/RW)  Transfer Training  Transfer Training: Yes  Overall Level of Assistance: Contact-guard assistance  Interventions: Verbal cues  Sit to Stand: Contact-guard assistance  Stand to Sit: Contact-guard assistance  Gait  Overall Level of Assistance: Contact-guard assistance; Adaptive equipment; Additional time  Assistive Device: Gait belt;Walker, rolling  Bed mobility  Supine to Sit: Stand by assistance  Sit to Supine: Stand by assistance  Transfers  Sit to Stand: Contact guard assistance  Stand to Sit: Contact guard assistance  Comment: Transfer performed w/ RW  Ambulation  Surface: Level tile  Device: Rolling Walker  Assistance: Contact guard assistance  Quality of Gait: Pt amb with decreased gait speed, decreased step length KATALINA, slight anterior trunk lean, no LOB. Distance: 12'  More Ambulation?: No  Stairs/Curb  Stairs?: No  Wheelchair Activities  Wheelchair Parts Management: No  Propulsion: No     Balance  Posture: Good  Sitting - Static: Good;-  Sitting - Dynamic: Good;-  Standing - Static: Fair;+  Standing - Dynamic: Fair  Comments: Standing balance assessed w/ RW  Exercise Treatment: Phlebomemist was in the room drawing blood fr approx. 5 min.         AM-PAC Score  AM-PAC Inpatient Mobility Raw Score : 19 (02/07/23 1317)  AM-PAC Inpatient T-Scale Score : 45.44 (02/07/23 1317)  Mobility Inpatient CMS 0-100% Score: 41.77 (02/07/23 1317)  Mobility Inpatient CMS G-Code Modifier : CK (02/07/23 1317)       Goals  Short Term Goals  Time Frame for Short Term Goals: 14 visits  Short Term Goal 1: Pt will amb 300' IND  Short Term Goal 2: Pt will be IND in all bed mobility tasks  Short Term Goal 3: Pt will be IND w/ transfers  Short Term Goal 4: Pt will be Tyrone in negotation of 3 steps w/ Unilateral UE assist       Education  Patient Education  Education Given To: Patient; Family (Son)  Education Provided: Role of Therapy;Plan of Care  Education Method: Demonstration;Verbal  Barriers to Learning: None  Education Outcome: Verbalized understanding;Demonstrated understanding      Therapy Time   Individual Concurrent Group Co-treatment   Time In 1019         Time Out 1052         Minutes 33         Timed Code Treatment Minutes: 8 Minutes       LORENA Harvey   This treatment/evaluation completed by signing SPT. Signing PT agrees with treatment and documentation.

## 2023-02-07 NOTE — CARE COORDINATION
Post Acute Facility/Agency List     Provided patient with the following list, the list includes the overall star ratings obtained from CMS per the Medicare Web site (www.Medicare.gov):     [] [address]  [] Acute Inpatient Rehabilitation Facilities  [] Skilled Nursing Facilities  [] Home Care    Provided verbal instructions on how to utilize the QR Code to obtain additional detailed star ratings from www. Medicare. gov     offered to print and provide the detailed list:    []Accepted   []Declined    The following printed detailed lists were provided:     [facility]. list

## 2023-02-07 NOTE — PROGRESS NOTES
Daily Progress Note  Neuro Critical Care    Patient Name: Trisha Sepulveda  Patient : 1950  Room/Bed: 0127/0127-01  Code Status: Full code  Allergies: No Known Allergies    CHIEF COMPLAINT:       Left-sided weakness. INTERVAL HISTORY      Initial Presentation (Admitted 2023):     67-year-old male with with past medical history of hypertension (on amlodipine) presented with complaint of left-sided numbness and weakness. As per patient, at around 11 AM he started feeling numb in the left upper extremity (going from shoulder down to the hand) along with weakness and having some loss of dexterity experienced while using his hand. He denies having any weakness in the left lower extremity but later on in the ED felt that left leg does not feel right. We called his sister and was brought to ER at [facility] with concern of stroke. On arrival to [facility] SVC's as BP was 138/68, . Patient had taken his morning amlodipine (unclear dosage) for hypertension. He denied using any antiplatelet/anticoagulation or statin. No history of smoking and drinks beer 2 times a week. On initial evaluation by stroke team, patient initially had NIH of 2 (left arm weakness and numbness). CT head without contrast: No acute abnormality. Remote left occipital infarct. CTA head and neck with contrast: Subtle linear hypodense focus in the proximal aspect of right ICA concerning for dissection versus right carotid terminus thrombus. Later on progressed to [place] of 8 (facial palsy, left arm, left leg, limb ataxia and sensory and dysarthria). Patient was given IV fluid 2 L and had improvement in exam again and NIH improved to 3. Due to pressure related changes favoring improved stenosis decided not to be obtained. And patient may benefit with stents with close ICU monitoring. An emergent thrombectomy for now.   Started on heparin, midodrine, aspirin, Brilinta and statin     On my eval, patient was awake alert and oriented x3 without any dysarthria or aphasia. No facial asymmetry noticed. No visual deficit noticed. Patient had numbness on the left side of the face left arm and left leg with difficulty appreciating sharp versus dull. Left upper extremity drift noticed. Left lower extremity 4/5 but no drift noticed. Patient and his sister was counseled about the management plan admission to the hospital in the ICU for close monitoring and further work-up. : Art line placed. Received midodrine, 1L NS due to low SBP. Improved left sided symptoms, especially numbness. NIH 2 (numbness, drift in LUE). Consent obtained by neuro endovascular team for ICA stenting : Weaned off dopamine, symptoms improving, taken for DSA yesterday which showed supraclinoid stenosis w clot, plan for stent. Last 24h:   No acute events overnight. Off dopamine, pressures maintaining. Started on midodrine. Plan for stenting on Wednesday.      CURRENT MEDICATIONS:  SCHEDULED MEDICATIONS:   sodium chloride  250 mL IntraVENous Once    ferrous sulfate  325 mg Oral Daily with breakfast    sodium chloride flush  5-40 mL IntraVENous 2 times per day    midodrine  10 mg Oral TID    atorvastatin  80 mg Oral Nightly    aspirin  81 mg Oral Daily    ticagrelor  90 mg Oral BID     CONTINUOUS INFUSIONS:   sodium chloride      DOPamine      sodium chloride      heparin 25,000 units in 0.9% sodium chloride 250 mL infusion 17 Units/kg/hr (23 0718)    sodium chloride 125 mL/hr at 23 0347     PRN MEDICATIONS:   sodium chloride flush, sodium chloride, sodium chloride flush, sodium chloride, ondansetron **OR** ondansetron, polyethylene glycol, perflutren lipid microspheres, acetaminophen **OR** acetaminophen    VITALS:  Temperature Range: Temp: 97.9 °F (36.6 °C) Temp  Av.2 °F (36.8 °C)  Min: 97.9 °F (36.6 °C)  Max: 98.7 °F (37.1 °C)  BP Range: Systolic (30QIB), QBP:226 , Min:92 , HYI:762     Diastolic (35LUB), HYM:69, Min:51, Max:102    Pulse Range: Pulse  Av.2  Min: 39  Max: 103  Respiration Range: Resp  Av.8  Min: 8  Max: 20  Current Pulse Ox: SpO2: 98 %  24HR Pulse Ox Range: SpO2  Av.2 %  Min: 89 %  Max: 100 %  Patient Vitals for the past 12 hrs:   BP Temp Temp src Pulse Resp SpO2   23 1000 120/73 -- -- 52 16 98 %   23 0900 122/64 97.9 °F (36.6 °C) Oral 50 14 97 %   23 0800 126/74 -- -- (!) 49 14 98 %   23 0700 126/70 -- -- (!) 42 11 99 %   23 0600 120/74 98.4 °F (36.9 °C) -- (!) 45 10 97 %   23 0500 (!) 144/72 -- -- (!) 48 15 98 %   23 0400 (!) 143/71 98.2 °F (36.8 °C) -- (!) 45 14 98 %   23 0300 127/71 -- -- (!) 42 13 98 %   23 0245 127/64 -- -- (!) 43 15 98 %   23 0230 115/71 -- -- (!) 39 (!) 9 98 %   23 0215 116/67 -- -- (!) 46 11 98 %   23 0210 116/67 -- -- (!) 43 11 97 %   23 0200 111/64 98.3 °F (36.8 °C) -- 59 15 100 %   23 0145 (!) 99/51 -- -- (!) 47 13 99 %   23 0130 (!) 97/54 -- -- (!) 45 13 97 %   23 0115 (!) 96/55 -- -- (!) 47 (!) 9 97 %   23 0100 (!) 103/52 -- -- 51 13 99 %   23 0045 (!) 104/54 -- -- 50 15 98 %   23 0030 112/ -- -- (!) 49 12 98 %   23 0015 112/ -- -- (!) 46 13 97 %   23 0000 (!) 102/52 98.2 °F (36.8 °C) -- 61 20 98 %   23 2345 121/67 -- -- (!) 47 12 99 %   23 2330 116/68 -- -- (!) 48 12 97 %   23 2315 111/ -- -- 52 14 96 %   23 2300 118/67 -- -- 69 14 98 %   23 2245 116/ -- -- 54 14 95 %     Estimated body mass index is 26.63 kg/m² as calculated from the following:    Height as of this encounter: 5' 7\" (1.702 m).     Weight as of this encounter: 170 lb (77.1 kg).  []<16 Severe malnutrition  []16-16.99 Moderate malnutrition  []17-18.49 Mild malnutrition  []18.5-24.9 Normal  [x]25-29.9 Overweight (not obese)  []30-34.9 Obese class 1 (Low Risk)  []35-39.9 Obese class 2 (Moderate Risk)  []?40 Obese class 3 (High Risk)    RECENT LABS:   Lab Results Component Value Date    WBC 8.0 02/07/2023    HGB 11.9 (L) 02/07/2023    HCT 35.3 (L) 02/07/2023     02/07/2023    CHOL 217 (H) 02/05/2023    TRIG 99 02/05/2023    HDL 51 02/05/2023    LDLCHOLESTEROL 146 (H) 02/05/2023     02/07/2023    K 4.1 02/07/2023     (H) 02/07/2023    CREATININE 1.01 02/07/2023    BUN 15 02/07/2023    CO2 17 (L) 02/07/2023    INR 1.0 02/04/2023    LABA1C 4.8 02/05/2023     24 HOUR INTAKE/OUTPUT:  Intake/Output Summary (Last 24 hours) at 2/7/2023 1037  Last data filed at 2/7/2023 0012  Gross per 24 hour   Intake 4085.14 ml   Output 2850 ml   Net 1235.14 ml       IMAGING:      Radiology Review:     1.) CT brain without contrast:  Impression   No acute intracranial abnormality. Remote left occipital lobe infarct. Findings were discussed with Dr. Michelle Sandoval At 1:28 pm on 2/4/2023.             2. ) CTA Head/Neck:  Impression   Subtle linear hypodense focus in the proximal aspect of the right internal   carotid artery with subsequent tapering of flow, from minimal to no flow, in   the mid and distal right cervical internal carotid artery concerning for a   dissection. Alternatively this may be the result of a right carotid terminus   thrombus. Angiography may be helpful in further characterization. No significant flow within the intracranial portion of the right internal   carotid artery. No significant stenosis or occlusion within the remainder of the intracranial   vessels. Findings were discussed with Dr. Michelle Sandoval At 1:37 pm on 2/4/2023. Labs and Images reviewed with:  [] Dr. Rodriguez Mendes. Margarita    [x] Dr. Danielle Muir  [] Dr. Mariajose Oneill  [] There are no new interval images to review. PHYSICAL EXAM       CONSTITUTIONAL:  Well developed, well nourished, alert and oriented x 3, in no acute distress. GCS 15. Nontoxic. No dysarthria. No aphasia.    HEAD:  normocephalic, atraumatic    EYES:  PERRLA, EOMI.   ENT:  moist mucous membranes   NECK: supple, symmetric   LUNGS:  Equal air entry bilaterally   CARDIOVASCULAR:  normal s1 / s2, RRR, distal pulses intact   ABDOMEN:  Soft, no rigidity   NEUROLOGIC:  Mental Status:  A & O x3,awake             Cranial Nerves:    cranial nerves II-XII are grossly intact    Motor Exam:    Drift:  present - mild left upper extremity  Tone:  normal    Motor exam is 5 out of 5 all extremities with the exception of left upper extremity 4+/5 and left lower extremity 4+/5    Sensory:    Touch:    Right Upper Extremity:  normal  Left Upper Extremity:  abnormal - slightly decreased sensation compared to right, but improved from previous   Right Lower Extremity:  normal  Left Lower Extremity:  normal    Plantar Response:  Right:  downgoing  Left:  downgoing    Clonus:  absent  Traore's:  absent    Coordination/Dysmetria:  Heel to Shin:  Right:  normal  Left: Normal  Finger to Nose:   Right:  normal  Left: Due to weakness  Dysdiadochokinesia: Not tested    Gait: Not tested   NIH Stroke Scale Total (if not done complete detailed one below):    1a.  Level of consciousness:  0 - alert; keenly responsive  1b. Level of consciousness questions:  0 - answers both questions correctly  1c. Level of consciousness questions:  0 - performs both tasks correctly  2. Best Gaze:  0 - normal  3. Visual:  0 - no visual loss  4. Facial Palsy:  0 - normal symmetric movement  5a. Motor left arm:  1 - drift, limb holds 90 (or 45) degrees but drifts down before full 10 seconds: does not hit bed  5b. Motor right arm:  0 - no drift, limb holds 90 (or 45) degrees for full 10 seconds  6a. Motor left le - no drift; leg holds 30 degree position for full 5 seconds  6b. Motor right le - no drift; leg holds 30 degree position for full 5 seconds  7. Limb Ataxia:  0 - absent  8. Sensory:  0 - normal; no sensory loss  9. Best Language:  0 - no aphasia, normal  10. Dysarthria:  0 - normal  11.   Extinction and Inattention:  0 - no abnormality  TOTAL: 2    DRAINS:  [x] There are no drains for Neuro Critical Care to monitor at this time. ASSESSMENT AND PLAN:       79-year-old male with with past medical history of hypertension (on amlodipine) presented with complaint of left-sided numbness and weakness. CTA concerning for right ICA tapering concerning for dissection versus thrombus. Fluctuation in NIH exam with improvement after midodrine and fluids. Admission to neuro critical care unit for close neurological exam monitoring and blood pressure goals. Started on dual antiplatelet and statin. Left sided weakness and numbness  CTA concerning for Rt ICA thrombus and dissection. NEUROLOGIC:  - Imaging   CT head without contrast: No acute abnormality. Remote left occipital infarct. CTA head and neck with contrast: Subtle linear hypodense focus in the proximal aspect of right ICA concerning for dissection versus right carotid terminus thrombus. - Neuroendocvas: IR stenting in on   - Hemoglobin A1c 4.8  - Lipid profile: Cholesterol 217, HDL 51, , triglyceride 99  - Holter monitor at discharge  - Echo ordered  -Midodrine started  -Heparin bolus 2000 units x 1 followed by heparin GTT 15u/kg  -Aspirin 650 mg x 1 followed by 81 mg daily.  -Brilinta 180 mg x 1 followed by 90 mg twice daily.   -Atorvastatin 40 mg nightly> increased to 80 mg based on lipid profile  - Goal -160  - Neuro checks per protocol      CARDIOVASCULAR:  BP Range: Systolic (05GBD), OE , Min:92 , SBL:165     Diastolic (31AXW), KUY:19, Min:51, Max:102    Pulse Range: Pulse  Av.2  Min: 44  Max: 103  - Right ICA dissection vs thrombus  - CTA H/N today: resolved occlusion of right ICA, moderate to severe stenosis upper portion of cavernous segment of right ICA and paraclinoid segment of right ICA   - Goal -160  - Continue telemetry  - Follow up Echo  - Continue ASA 81, brilinta, atorvastatin  - On Heparin gtt    PULMONARY:    Respiration Range: Resp  Av.8  Min: 8  Max: 20  Current Pulse Ox: SpO2: 98 %  - On room air  - CXR: No acute cardiopulmonary process    RENAL/FLUID/ELECTROLYTE:    Lab Results   Component Value Date/Time     2023 05:33 AM    K 4.1 2023 05:33 AM     2023 05:33 AM    CO2 17 2023 05:33 AM    BUN 15 2023 05:33 AM    CREATININE 1.01 2023 05:33 AM    GLUCOSE 82 2023 05:33 AM    CALCIUM 8.6 2023 05:33 AM          I/O last 3 completed shifts: In: 6308.8 [I.V.:6308.8]  Out: 5550 [Urine:5550]  No intake/output data recorded. - IVF: 125ml/hr  - Replace electrolytes PRN  - Daily BMP    GI/NUTRITION:  NUTRITION:  ADULT DIET; Regular  Diet NPO Exceptions are: Sips of Water with Meds  - Bowel regimen: Not indicated  - GI prophylaxis: not indicated. Recent Labs     23  0703 23  1045   WBC 8.0 8.3 9.7   HGB 11.9* 13.5 13.7   HCT 35.3* 38.5* 40.0*   MCV 92.9 90.4 91.5    236 235       ID:  Temperature Range: Temp: 97.9 °F (36.6 °C) Temp  Av.2 °F (36.8 °C)  Min: 97.9 °F (36.6 °C)  Max: 98.7 °F (37.1 °C)    - Infectious work up unremarkable  - Continue to monitor for fevers  - Daily CBC    HEME:     - Daily CBC  - H/H stable    ENDOCRINE:  - Continue to monitor blood glucose, goal <180    OTHER:  - PT/OT/ST   - PMR consult post stenting   - Code Status: Full code    PROPHYLAXIS:  Stress ulcer: not indicated    DVT PROPHYLAXIS:  - SCD sleeves - Thigh High   Oh heparin. DISPOSITION:  [x] To remain ICU: Stenting planned for tomorrow   [] OK for out of ICU from Neuro Critical Care standpoint    We will continue to follow along. For any changes in exam or patient status please contact Neuro Critical Care.       Jamal Vance MD  Neuro Critical Care  2023     10:37 AM

## 2023-02-07 NOTE — PROGRESS NOTES
Speech Language Pathology  Speech Language Pathology  [address] [facility]    Cognitive Treatment Note    Date: 2/7/2023  Patients Name: Anh Collado  MRN: [id number]  Diagnosis:   Patient Active Problem List   Diagnosis Code    Acute ischemic stroke [facility]) I63.9    ICAO (internal carotid artery occlusion), right I65.21    Left-sided weakness R53.1       Pain: Pt does not complain of pain. Cognitive Treatment    Treatment time: 9:57- 10:20      Subjective: [x] Alert [x] Cooperative     [] Confused     [] Agitated    [] Lethargic      Objective/Assessment:    Recall: Delayed recall of four units: 9/12, increasing to 12/12 with moderate verbal cueing. Problem Solving/Reasoning: Complete these analogies 18/24, increasing to 24/24 with minimal to moderate cueing. Multiple sentence formulation 6/6. Plan:  [x] Continue ST services    [] Discharge from ST:      Discharge recommendations: []  Further therapy recommended at discharge. The patient should be able to tolerate at least 3 hours of therapy per day over 5 days or 15 hours over 7 days. [x] Further therapy recommended at discharge. [] No therapy recommended at discharge. Treatment completed by: Completed by: Chad Riedel  Clinician    Cosigned By: Ramez Olvera S.CCC/SLP

## 2023-02-07 NOTE — PROGRESS NOTES
Endovascular Neurosurgery Progress Note      Reason for evaluation: R ICA occlusion     SUBJECTIVE:     No acute events overnight, patient denied any headache, weakness or numbness     Review of Systems:  CONSTITUTIONAL:  negative for fevers, chills, fatigue and malaise    EYES:  negative for double vision, blurred vision and photophobia     HEENT:  negative for tinnitus, epistaxis and sore throat    RESPIRATORY:  negative for cough, shortness of breath, wheezing    CARDIOVASCULAR:  negative for chest pain, palpitations, syncope, edema    GASTROINTESTINAL:  negative for nausea, vomiting    GENITOURINARY:  negative for incontinence    MUSCULOSKELETAL:  negative for neck or back pain    NEUROLOGICAL:  Negative for weakness and tingling  negative for headaches and dizziness    PSYCHIATRIC:  negative for anxiety      Review of systems otherwise negative. OBJECTIVE:     Vitals:    02/07/23 0900   BP: 122/64   Pulse: 50   Resp: 14   Temp: 97.9 °F (36.6 °C)   SpO2: 97%        General:  Gen: normal habitus, NAD  HEENT: NCAT, mucosa moist  Cvs: RRR, S1 S2 normal  Resp: symmetric unlabored breathing  Abd: s/nd/nt  Ext: no edema  Skin: no lesions seen, warm and dry    Neuro:  Gen: awake and alert, oriented x4. Lang/speech: no aphasia and dysarthria. Follows commands. CN: PERRL, EOMI, VFF, V1-3 intact, left face droop, hearing intact, shoulder shrug symmetric, tongue midline  Motor: grossly 5/5 UE and LE no drift  Sense:normal on the right, no sensation on the left  Coord: normal FTN and HTS  DTR: deferred  Gait: deferred    NIH Stroke Scale:   1a  Level of consciousness: 0 - alert; keenly responsive   1b. LOC questions:  0 - answers both questions correctly   1c. LOC commands: 0 - performs both tasks correctly   2. Best Gaze: 0 - normal   3. Visual: 0 - no visual loss   4. Facial Palsy: 0 - normal symmetric movement   5a. Motor left arm: 0   5b.   Motor right arm: 0 - no drift, limb holds 90 (or 45) degrees for full 10 seconds   6a. Motor left le - no drift; leg holds 30 degree position for full 5 seconds   6b  Motor right le - no drift; leg holds 30 degree position for full 5 seconds   7. Limb Ataxia: 0 - absent   8. Sensory: 1 - mild to moderate sensory loss; patient feels pinprick is less sharp or is dull on the affected side; there is a loss of superficial pain with pinprick but patient is aware of being touched    9. Best Language:  0 - no aphasia, normal   10. Dysarthria: 0 - normal   11. Extinction and Inattention: 1 - visual, tactile, auditory, spatial or personal inattention or extinction to bilateral simultaneous stimulation in one of the sensory modalities          Total:   2     MRS: 2      LABS:   Reviewed. Lab Results   Component Value Date    HGB 11.9 (L) 2023    WBC 8.0 2023     2023     2023    BUN 15 2023    CREATININE 1.01 2023    MG 1.9 2023    APTT 70.3 (H) 2023    INR 1.0 2023      No results found for: COVID19    RADIOLOGY:   Images were personally reviewed including:    CTA head and neck 23:       CTA head neck 23            MRI brain w/o con on 23: right posterior frontal parietal lobe acute stroke      ASSESSMENT:     75year old man with HTN, p/w acute onset of left hemiparesis, and found to have R ICA occlusion with right posterior frontal-parietal lobe acute stroke. Patient's symptoms significantly improved over last 2 days.  Repeat CTA head and neck on  showed complete recanalization of the right intracranial ICA occlusion, with no evidence of large vessel occlusion   DSA completed on  which showed Right ICA supraclinoid segment stenosis with possible super-imposed thrombus    PLAN:     - con't aspirin 81mg daily  - con't brillinta 90mg BID  - c/w Hep gtt PTT 70.3   - con't lipitor 80mg daily  - -160 mm Hg Wean off Dopamine   - PT/OT       Case discussed with Dr. Nancy Elizabeth attending.     Angie Youngblood MD  Stroke, [facility]  [facility]  Electronically signed 2/7/2023 at 9:23 AM

## 2023-02-07 NOTE — PLAN OF CARE
Problem: Discharge Planning  Goal: Discharge to home or other facility with appropriate resources  2/7/2023 0341 by Rodríguez Giles RN  Outcome: Progressing     Problem: Skin/Tissue Integrity  Goal: Absence of new skin breakdown  Description: 1. Monitor for areas of redness and/or skin breakdown  2. Assess vascular access sites hourly  3. Every 4-6 hours minimum:  Change oxygen saturation probe site  4. Every 4-6 hours:  If on nasal continuous positive airway pressure, respiratory therapy assess nares and determine need for appliance change or resting period.   2/7/2023 0341 by Rodríguez Giles RN  Outcome: Progressing     Problem: ABCDS Injury Assessment  Goal: Absence of physical injury  2/7/2023 0341 by Rodríguez Giles RN  Outcome: Progressing     Problem: Safety - Adult  Goal: Free from fall injury  2/7/2023 0341 by Rodríguez Giles RN  Outcome: Progressing     Problem: Chronic Conditions and Co-morbidities  Goal: Patient's chronic conditions and co-morbidity symptoms are monitored and maintained or improved  2/7/2023 0341 by Rodríguez Giles RN  Outcome: Progressing

## 2023-02-07 NOTE — PROGRESS NOTES
Occupational Therapy  Facility/Department: [address] NEURO ICU  Occupational Therapy Initial Assessment    Name: Roger Day  : 1950  MRN: [id number]  Date of Service: 2023    Patient presents with    Cerebrovascular Accident       Discharge Recommendations:  Patient would benefit from continued therapy after discharge          Patient Diagnosis(es): The encounter diagnosis was Left-sided weakness. Past Medical History:  has a past medical history of HTN (hypertension). Past Surgical History:  has no past surgical history on file. Assessment   Performance deficits / Impairments: Decreased functional mobility ; Decreased ADL status; Decreased safe awareness;Decreased balance;Decreased high-level IADLs  Assessment: Pt required increased time for functional mobility this date with cues for proper use of RW and to avoid obsticles with Good return. Pt demo ROM/strength [place] for ADL tasks with Good functional reach required for LB ADL's. Pt would benefit from continued OT services to increase IND/safety in ADL's prior to returning home. Prognosis: Good  Decision Making: Medium Complexity  REQUIRES OT FOLLOW-UP: Yes  Activity Tolerance  Activity Tolerance: Patient Tolerated treatment well        Plan   Occupational Therapy Plan  Times Per Week: 2-4x/week  Current Treatment Recommendations: Balance training, Functional mobility training, Safety education & training, Patient/Caregiver education & training, Equipment evaluation, education, & procurement, Self-Care / ADL     Restrictions  Restrictions/Precautions  Restrictions/Precautions: Up as Tolerated, General Precautions  Required Braces or Orthoses?: No  Position Activity Restriction  Other position/activity restrictions: 10lb lifting restriction until  at 1630. -180 mmHg. Subjective   General  Patient assessed for rehabilitation services?: Yes  Family / Caregiver Present: Yes (son)  General Comment  Comments: RN okayed for therapy.  Pt agreeable and cooperative throughout therapy evaluation. Pt denies pain     Social/Functional History  Social/Functional History  Lives With: Alone  Type of Home: House  Home Layout: One level  Home Access: Stairs to enter with rails  Entrance Stairs - Number of Steps: 3  Entrance Stairs - Rails: Both  Bathroom Shower/Tub: Tub/Shower unit  Bathroom Toilet: Standard  Bathroom Equipment: Grab bars in shower (non-slip mat)  Bathroom Accessibility: Accessible  Has the patient had two or more falls in the past year or any fall with injury in the past year?: No  Receives Help From: Family  ADL Assistance: [address]: Independent  Homemaking Responsibilities: Yes  Ambulation Assistance: Independent  Transfer Assistance: Independent  Active : Yes  Mode of Transportation: Car, Truck  Occupation: Part time employment (\"semi-retired\")  Type of Occupation:  at [employer]  Additional Comments: Pt reports 2 Retired sisters nearby, Son will be in town for 2 weeks to help out. 24/7 assist       Objective     Safety Devices  Type of Devices: Call light within reach;Gait belt;Patient at risk for falls; Left in bed;Nurse notified (son at bedside)  Restraints  Restraints Initially in Place: No    Bed Mobility Training  Bed Mobility Training: Yes  Overall Level of Assistance: Stand-by assistance  Interventions: Verbal cues  Supine to Sit: Stand-by assistance  Sit to Supine: Stand-by assistance  Scooting: Stand-by assistance    Balance  Sitting: Without support (SBA EOB)  Standing: With support (CGA w/RW)    Transfer Training  Transfer Training: Yes  Overall Level of Assistance: Contact-guard assistance  Interventions: Verbal cues  Sit to Stand: Contact-guard assistance  Stand to Sit: Contact-guard assistance    Gait  Overall Level of Assistance: Contact-guard assistance; Adaptive equipment; Additional time  Assistive Device: Gait belt;Walker, rolling     AROM: Within functional limits  Strength: Within functional limits (5/5 BUE grossly)  Coordination: Within functional limits  Tone: Normal  Sensation: Intact    ADL  Feeding: Independent  Grooming: Independent  UE Bathing: Stand by assistance  LE Bathing: Contact guard assistance  UE Dressing: Contact guard assistance  LE Dressing: Contact guard assistance  LE Dressing Skilled Clinical Factors: don/doff socks sitting EOB  Toileting: Contact guard assistance  Additional Comments: Pt BUE ROM/strength WFL for ADL's. Pt demo functional reach required for LB ADL's. Additional time required for functional mobility around unit with cues for safety. Activity Tolerance  Activity Tolerance: Patient tolerated treatment well        Vision  Vision: Impaired  Vision Exceptions: Wears glasses at all times  Hearing  Hearing: Within functional limits    Cognition  Overall Cognitive Status: [facility]  Orientation  Overall Orientation Status: Within Functional Limits                  Education Given To: Patient  Education Provided: Role of Therapy;Plan of Care;Transfer Training;Equipment  Education Provided Comments: RW management, hand placement, role of therapy.  Good return  Education Method: Verbal  Barriers to Learning: None  Education Outcome: Verbalized understanding;Continued education needed;Demonstrated understanding      AM-PAC Score        AM-[facility] Inpatient Daily Activity Raw Score: 20 (02/07/23 1335)  AM-PAC Inpatient ADL T-Scale Score : 42.03 (02/07/23 1335)  ADL Inpatient CMS 0-100% Score: 38.32 (02/07/23 1335)  ADL Inpatient CMS G-Code Modifier : CJ (02/07/23 1335)    Goals  Short Term Goals  Time Frame for Short Term Goals: By discharge  Short Term Goal 1: Pt will complete UB ADL's IND  Short Term Goal 2: Pt will complete LB ADL's Mod I with AE/DME/modified techniques as needed  Short Term Goal 3: Pt will complete functional mobility/transfers SBA with LRD as needed  Short Term Goal 4: Pt will engage in functional task 10+ mins standing SBA and reaching out of PABLO without LOB  Short Term Goal 5: Pt will demo Good safety throughout session without VC's       Therapy Time   Individual Concurrent Group Co-treatment   Time In 1020         Time Out 1051         Minutes 31      Co-susie w/PT   Timed Code Treatment Minutes: [address] [facility], OTR/L

## 2023-02-07 NOTE — PROCEDURES
PROCEDURE NOTE - ARTERIAL LINE PLACEMENT    PATIENT NAME: Evaristo [facility] RECORD NO. [id number]  DATE: 2/7/2023  ATTENDING PHYSICIAN:  Dr. Esvin Grimes DIAGNOSIS:  Need for blood pressure monitoring  POSTOPERATIVE DIAGNOSIS:  Same  PROCEDURE PERFORMED: Right Radial Arterial Line Insertion  PERFORMING PHYSICIAN:  Raffaele Watson MD  ESTIMATED BLOOD LOSS:  Less than 25 ml  COMPLICATIONS:  None immediately appreciated. DISCUSSION:  Ute Guzman is a 75 y.o. male who requires invasive pressure monitoring. The history and physical examination were reviewed and confirmed. CONSENT: The patient provided verbal consent for this procedure. PROCEDURE:  A timeout was initiated by the bedside nurse and was confirmed by those present. The patient was placed in a supine position. The skin overlying the Right Radial was prepped with chlorhexadine. Through this region, the introducer needle through catheter was inserted into R radial A until pulsatile bright blood was seen in collection tubing. Guidewire was advanced with no resistance. Catheter was advanced into the artery, wire was pulled with brisk bleeding noted. Pressure monitoring setup was connected to the catheter, it aspirated and flushed easily. The catheter was secured to the wrist with 3-0 silk. No immediate complication was evident. All sponge, instrument and needle counts were correct at the completion of the procedure.       Raffaele Watson MD  6:57 AM, 2/7/23

## 2023-02-07 NOTE — PROGRESS NOTES
Physical Medicine & Rehabilitation  Progress Note        Admitting Physician:  Yulia Valdez MD    Primary Care Provider:  Kaye Ryan MD     Chief Complaint:  Left-sided numbness and weakness    Brief History: This is a follow up to the initial consult on Mr. Zoya Shah who is a 75 y.o. male admitted to [facility] on 2/4/2023 with Cerebrovascular Accident    He initially presented with left-sided numbness and weakness for a few hours. NIHSS 2 with progression to NIHSS 8 - improved with IV fluid. CTA head/neck showed possible dissection versus thrombus in the right carotid artery. He was started on a heparin drip. MRI brain showed subacute ischemia involving the right parietal lobe. He underwent diagnostic cerebral angiogram on 2/6/23, which showed right KAYLI supraclinoid segment stenosis. Subjective:  He reports doing pretty well today. He denies any pain and shortness of breath. ROS:  Review of Systems   Constitutional:  Negative for fever. Respiratory:  Negative for shortness of breath. Cardiovascular:  Negative for chest pain. Gastrointestinal:  Negative for abdominal pain. Rehabilitation:    Physical Therapy    Restrictions/Precautions: Up as Tolerated, General Precautions  Other position/activity restrictions: 10lb lifting restriction until 2/8 at 1630. -180 mmHg. DSA 2/6. Bed mobility  Supine to Sit: Stand by assistance  Sit to Supine: Stand by assistance    Transfers  Sit to Stand: Contact guard assistance  Stand to Sit: Contact guard assistance  Comment: Transfer performed w/ RW    Ambulation  Surface: Level tile  Device: Rolling Walker  Assistance: Contact guard assistance  Quality of Gait: Pt amb with decreased gait speed, decreased step length KATALINA, slight anterior trunk lean, no LOB.   Distance: 12'  More Ambulation?: No      Occupational Therapy    ADL  Feeding: Independent  Grooming: Independent  UE Bathing: Stand by assistance  LE Bathing: Contact guard assistance  UE Dressing: Contact guard assistance  LE Dressing: Contact guard assistance  LE Dressing Skilled Clinical Factors: don/doff socks sitting EOB  Toileting: Contact guard assistance  Additional Comments: Pt BUE ROM/strength WFL for ADL's. Pt demo functional reach required for LB ADL's. Additional time required for functional mobility around unit with cues for safety. Speech Therapy  Subjective: [x] Alert     [x] Cooperative     [] Confused     [] Agitated    [] Lethargic        Objective/Assessment:     Recall: Delayed recall of four units: 9/12, increasing to 12/12 with moderate verbal cueing. Problem Solving/Reasoning: Complete these analogies 18/24, increasing to 24/24 with minimal to moderate cueing. Multiple sentence formulation 6/6.        Current Medications:   Current Facility-Administered Medications: 0.9 % sodium chloride bolus, 250 mL, IntraVENous, Once  ferrous sulfate (FE TABS 325) EC tablet 325 mg, 325 mg, Oral, Daily with breakfast  sodium chloride flush 0.9 % injection 5-40 mL, 5-40 mL, IntraVENous, 2 times per day  sodium chloride flush 0.9 % injection 5-40 mL, 5-40 mL, IntraVENous, PRN  0.9 % sodium chloride infusion, , IntraVENous, PRN  midodrine (PROAMATINE) tablet 10 mg, 10 mg, Oral, TID  DOPamine (INTROPIN) 400 mg in dextrose 5 % 250 mL infusion, 1-20 mcg/kg/min, IntraVENous, Continuous  atorvastatin (LIPITOR) tablet 80 mg, 80 mg, Oral, Nightly  sodium chloride flush 0.9 % injection 5-40 mL, 5-40 mL, IntraVENous, PRN  0.9 % sodium chloride infusion, , IntraVENous, PRN  aspirin chewable tablet 81 mg, 81 mg, Oral, Daily  ticagrelor (BRILINTA) tablet 90 mg, 90 mg, Oral, BID  ondansetron (ZOFRAN-ODT) disintegrating tablet 4 mg, 4 mg, Oral, Q8H PRN **OR** ondansetron (ZOFRAN) injection 4 mg, 4 mg, IntraVENous, Q6H PRN  polyethylene glycol (GLYCOLAX) packet 17 g, 17 g, Oral, Daily PRN  perflutren lipid microspheres (DEFINITY) injection 1.5 mL, 1.5 mL, IntraVENous, ONCE PRN  acetaminophen (TYLENOL) tablet 650 mg, 650 mg, Oral, Q4H PRN **OR** acetaminophen (TYLENOL) suppository 650 mg, 650 mg, Rectal, Q4H PRN  heparin (porcine) 25,000 Units in sodium chloride 0.9 % 250 mL infusion, 5-30 Units/kg/hr, IntraVENous, Continuous  0.9 % sodium chloride infusion, , IntraVENous, Continuous    Objective:  /67   Pulse (!) 47   Temp 98.6 °F (37 °C) (Oral)   Resp 14   Ht 5' 7\" (1.702 m)   Wt 170 lb (77.1 kg)   SpO2 96%   BMI 26.63 kg/m²       GEN: Well developed, well nourished, no acute distress  HEENT: NCAT. EOM grossly intact. Hearing grossly intact. Mucous membranes pink and moist.  RESP: Normal breath sounds with no wheezing, rales, or rhonchi. Respirations WNL and unlabored. CV: Regular rate and rhythm. No murmurs, rubs, or gallops. ABD: Soft, non-distended, BS+ and equal.  NEURO: Alert. Speech fluent. Sensation to light touch intact. MSK: Muscle tone and bulk are normal bilaterally. Has antigravity strength in the bilateral upper limbs. Strength 4+/5 with bilateral ankle dorsiflexion and plantarflexion. LIMBS: No edema in bilateral lower limbs. SKIN: Warm and dry with good turgor. PSYCH: Mood WNL. Affect WNL. Appropriately interactive. Diagnostics:     CBC:   Recent Labs     02/05/23  1045 02/06/23  0703 02/07/23  0533   WBC 9.7 8.3 8.0   RBC 4.37 4.26 3.80*   HGB 13.7 13.5 11.9*   HCT 40.0* 38.5* 35.3*   MCV 91.5 90.4 92.9   RDW 11.3* 11.4* 11.5*    236 195     BMP:   Recent Labs     02/05/23  1045 02/06/23  0938 02/07/23  0533    136 138   K 3.7 4.0 4.1    104 109*   CO2 19* 20 17*   BUN 10 8 15   CREATININE 0.80 0.82 1.01     BNP: No results for input(s): BNP in the last 72 hours. PT/INR:   No results for input(s): PROTIME, INR in the last 72 hours.     APTT:   Recent Labs     02/07/23  0533 02/07/23  1047 02/07/23  1717   APTT 70.3* 65.9* 59.5*     CARDIAC ENZYMES: No results for input(s): CKMB, CKMBINDEX, TROPONINT in the last 72 hours.    Invalid input(s): CKTOTAL;3  FASTING LIPID PANEL:  Lab Results   Component Value Date    CHOL 217 (H) 02/05/2023    HDL 51 02/05/2023    TRIG 99 02/05/2023     LIVER PROFILE: No results for input(s): AST, ALT, ALB, BILIDIR, BILITOT, ALKPHOS in the last 72 hours. Impression:     Right parietal CVA  Left hemiparesis  Mild impairment of cognition  HTN     Recommendations:     Diagnosis:  Right parietal CVA  Therapy: Has PT/OT/SLP needs  Medical Necessity: As above  Support: Lives alone  Rehab Recommendation: The patient does not meet criteria for acute inpatient rehabilitation at this time, as he is too high-functioning.   DVT Prophylaxis: On heparin drip      Electronically signed by Radha William MD on 2/7/2023 at 9:31 PM

## 2023-02-07 NOTE — CARE COORDINATION
Spoke to patient and sister at bedside. Patient tells me  he thinks he can go home and would  like to have therapy at home if at all possible. [facility] and asked him to review list and choose several agencies.

## 2023-02-08 LAB
ABO/RH: NORMAL
ABSOLUTE EOS #: 0.11 K/UL (ref 0–0.44)
ABSOLUTE IMMATURE GRANULOCYTE: 0.05 K/UL (ref 0–0.3)
ABSOLUTE LYMPH #: 1.16 K/UL (ref 1.1–3.7)
ABSOLUTE MONO #: 0.73 K/UL (ref 0.1–1.2)
ANION GAP SERPL CALCULATED.3IONS-SCNC: 10 MMOL/L (ref 9–17)
ANION GAP SERPL CALCULATED.3IONS-SCNC: 11 MMOL/L (ref 9–17)
ANTIBODY SCREEN: NEGATIVE
ARM BAND NUMBER: NORMAL
BASOPHILS # BLD: 0 % (ref 0–2)
BASOPHILS ABSOLUTE: <0.03 K/UL (ref 0–0.2)
BUN SERPL-MCNC: 18 MG/DL (ref 8–23)
BUN SERPL-MCNC: 21 MG/DL (ref 8–23)
CA-I BLD-SCNC: 1.18 MMOL/L (ref 1.13–1.33)
CALCIUM SERPL-MCNC: 8.3 MG/DL (ref 8.6–10.4)
CALCIUM SERPL-MCNC: 8.8 MG/DL (ref 8.6–10.4)
CHLORIDE SERPL-SCNC: 110 MMOL/L (ref 98–107)
CHLORIDE SERPL-SCNC: 111 MMOL/L (ref 98–107)
CO2 SERPL-SCNC: 16 MMOL/L (ref 20–31)
CO2 SERPL-SCNC: 19 MMOL/L (ref 20–31)
CREAT SERPL-MCNC: 1.07 MG/DL (ref 0.7–1.2)
CREAT SERPL-MCNC: 1.24 MG/DL (ref 0.7–1.2)
EOSINOPHILS RELATIVE PERCENT: 2 % (ref 1–4)
EXPIRATION DATE: NORMAL
GFR SERPL CREATININE-BSD FRML MDRD: >60 ML/MIN/1.73M2
GFR SERPL CREATININE-BSD FRML MDRD: >60 ML/MIN/1.73M2
GLUCOSE SERPL-MCNC: 87 MG/DL (ref 70–99)
GLUCOSE SERPL-MCNC: 89 MG/DL (ref 70–99)
HCT VFR BLD AUTO: 31.4 % (ref 40.7–50.3)
HGB BLD-MCNC: 10.4 G/DL (ref 13–17)
IMMATURE GRANULOCYTES: 1 %
LYMPHOCYTES # BLD: 18 % (ref 24–43)
MCH RBC QN AUTO: 31.7 PG (ref 25.2–33.5)
MCHC RBC AUTO-ENTMCNC: 33.1 G/DL (ref 28.4–34.8)
MCV RBC AUTO: 95.7 FL (ref 82.6–102.9)
MONOCYTES # BLD: 12 % (ref 3–12)
NRBC AUTOMATED: 0 PER 100 WBC
PARTIAL THROMBOPLASTIN TIME: 62.3 SEC (ref 20.5–30.5)
PDW BLD-RTO: 11.8 % (ref 11.8–14.4)
PLATELET # BLD AUTO: 175 K/UL (ref 138–453)
PMV BLD AUTO: 10.1 FL (ref 8.1–13.5)
POTASSIUM SERPL-SCNC: 4 MMOL/L (ref 3.7–5.3)
POTASSIUM SERPL-SCNC: 4.5 MMOL/L (ref 3.7–5.3)
RBC # BLD: 3.28 M/UL (ref 4.21–5.77)
SEG NEUTROPHILS: 67 % (ref 36–65)
SEGMENTED NEUTROPHILS ABSOLUTE COUNT: 4.3 K/UL (ref 1.5–8.1)
SODIUM SERPL-SCNC: 137 MMOL/L (ref 135–144)
SODIUM SERPL-SCNC: 140 MMOL/L (ref 135–144)
WBC # BLD AUTO: 6.4 K/UL (ref 3.5–11.3)

## 2023-02-08 PROCEDURE — 86900 BLOOD TYPING SEROLOGIC ABO: CPT

## 2023-02-08 PROCEDURE — 6370000000 HC RX 637 (ALT 250 FOR IP)

## 2023-02-08 PROCEDURE — 97116 GAIT TRAINING THERAPY: CPT

## 2023-02-08 PROCEDURE — 6370000000 HC RX 637 (ALT 250 FOR IP): Performed by: STUDENT IN AN ORGANIZED HEALTH CARE EDUCATION/TRAINING PROGRAM

## 2023-02-08 PROCEDURE — 6370000000 HC RX 637 (ALT 250 FOR IP): Performed by: PSYCHIATRY & NEUROLOGY

## 2023-02-08 PROCEDURE — 85730 THROMBOPLASTIN TIME PARTIAL: CPT

## 2023-02-08 PROCEDURE — 2000000000 HC ICU R&B

## 2023-02-08 PROCEDURE — 2580000003 HC RX 258: Performed by: PSYCHIATRY & NEUROLOGY

## 2023-02-08 PROCEDURE — 85025 COMPLETE CBC W/AUTO DIFF WBC: CPT

## 2023-02-08 PROCEDURE — 97130 THER IVNTJ EA ADDL 15 MIN: CPT

## 2023-02-08 PROCEDURE — 97129 THER IVNTJ 1ST 15 MIN: CPT

## 2023-02-08 PROCEDURE — 86850 RBC ANTIBODY SCREEN: CPT

## 2023-02-08 PROCEDURE — 2580000003 HC RX 258: Performed by: STUDENT IN AN ORGANIZED HEALTH CARE EDUCATION/TRAINING PROGRAM

## 2023-02-08 PROCEDURE — 86901 BLOOD TYPING SEROLOGIC RH(D): CPT

## 2023-02-08 PROCEDURE — 82330 ASSAY OF CALCIUM: CPT

## 2023-02-08 PROCEDURE — 36415 COLL VENOUS BLD VENIPUNCTURE: CPT

## 2023-02-08 PROCEDURE — 93005 ELECTROCARDIOGRAM TRACING: CPT | Performed by: PSYCHIATRY & NEUROLOGY

## 2023-02-08 PROCEDURE — 80048 BASIC METABOLIC PNL TOTAL CA: CPT

## 2023-02-08 PROCEDURE — 99231 SBSQ HOSP IP/OBS SF/LOW 25: CPT | Performed by: PSYCHIATRY & NEUROLOGY

## 2023-02-08 PROCEDURE — 97110 THERAPEUTIC EXERCISES: CPT

## 2023-02-08 RX ORDER — LOPERAMIDE HYDROCHLORIDE 2 MG/1
2 CAPSULE ORAL 4 TIMES DAILY PRN
Status: DISCONTINUED | OUTPATIENT
Start: 2023-02-08 | End: 2023-02-09 | Stop reason: HOSPADM

## 2023-02-08 RX ORDER — MIDODRINE HYDROCHLORIDE 5 MG/1
5 TABLET ORAL 3 TIMES DAILY
Status: DISCONTINUED | OUTPATIENT
Start: 2023-02-08 | End: 2023-02-09

## 2023-02-08 RX ADMIN — SODIUM CHLORIDE, PRESERVATIVE FREE 10 ML: 5 INJECTION INTRAVENOUS at 08:39

## 2023-02-08 RX ADMIN — TICAGRELOR 90 MG: 90 TABLET ORAL at 19:58

## 2023-02-08 RX ADMIN — MIDODRINE HYDROCHLORIDE 5 MG: 5 TABLET ORAL at 19:58

## 2023-02-08 RX ADMIN — ATORVASTATIN CALCIUM 80 MG: 80 TABLET, FILM COATED ORAL at 19:58

## 2023-02-08 RX ADMIN — SODIUM CHLORIDE, PRESERVATIVE FREE 30 ML: 5 INJECTION INTRAVENOUS at 20:08

## 2023-02-08 RX ADMIN — SODIUM CHLORIDE: 9 INJECTION, SOLUTION INTRAVENOUS at 08:43

## 2023-02-08 RX ADMIN — FERROUS SULFATE TAB EC 325 MG (65 MG FE EQUIVALENT) 325 MG: 325 (65 FE) TABLET DELAYED RESPONSE at 08:38

## 2023-02-08 RX ADMIN — TICAGRELOR 90 MG: 90 TABLET ORAL at 08:38

## 2023-02-08 RX ADMIN — ASPIRIN 81 MG: 81 TABLET, CHEWABLE ORAL at 08:38

## 2023-02-08 RX ADMIN — LOPERAMIDE HYDROCHLORIDE 2 MG: 2 CAPSULE ORAL at 21:40

## 2023-02-08 ASSESSMENT — PAIN SCALES - GENERAL
PAINLEVEL_OUTOF10: 0

## 2023-02-08 NOTE — PROGRESS NOTES
Daily Progress Note  Neuro Critical Care    Patient Name: Zandra Espinal  Patient : 1950  Room/Bed: 0127/0127-01  Code Status: Full code  Allergies: No Known Allergies    CHIEF COMPLAINT:       Left-sided weakness. INTERVAL HISTORY      Initial Presentation (Admitted 2023):     79-year-old male with with past medical history of hypertension (on amlodipine) presented with complaint of left-sided numbness and weakness. As per patient, at around 11 AM he started feeling numb in the left upper extremity (going from shoulder down to the hand) along with weakness and having some loss of dexterity experienced while using his hand. He denies having any weakness in the left lower extremity but later on in the ED felt that left leg does not feel right. We called his sister and was brought to ER at [facility] with concern of stroke. On arrival to [facility] SVC's as BP was 138/68, . Patient had taken his morning amlodipine (unclear dosage) for hypertension. He denied using any antiplatelet/anticoagulation or statin. No history of smoking and drinks beer 2 times a week. On initial evaluation by stroke team, patient initially had NIH of 2 (left arm weakness and numbness). CT head without contrast: No acute abnormality. Remote left occipital infarct. CTA head and neck with contrast: Subtle linear hypodense focus in the proximal aspect of right ICA concerning for dissection versus right carotid terminus thrombus. Later on progressed to [place] of 8 (facial palsy, left arm, left leg, limb ataxia and sensory and dysarthria). Patient was given IV fluid 2 L and had improvement in exam again and NIH improved to 3. Due to pressure related changes favoring improved stenosis decided not to be obtained. And patient may benefit with stents with close ICU monitoring. An emergent thrombectomy for now.   Started on heparin, midodrine, aspirin, Brilinta and statin     On my eval, patient was awake alert and oriented x3 without any dysarthria or aphasia. No facial asymmetry noticed. No visual deficit noticed. Patient had numbness on the left side of the face left arm and left leg with difficulty appreciating sharp versus dull. Left upper extremity drift noticed. Left lower extremity 4/5 but no drift noticed. Patient and his sister was counseled about the management plan admission to the hospital in the ICU for close monitoring and further work-up. : Art line placed. Received midodrine, 1L NS due to low SBP. Improved left sided symptoms, especially numbness. NIH 2 (numbness, drift in LUE). Consent obtained by neuro endovascular team for ICA stenting : Weaned off dopamine, symptoms improving, taken for DSA yesterday which showed supraclinoid stenosis w clot, plan for stent. : off dopamine, pressures maintaining with unchanged exam. Started on midodrine    Last 24h:   No acute events overnight. States that sensation feels improved in left arm.  Plan for stent placement today    CURRENT MEDICATIONS:  SCHEDULED MEDICATIONS:   midodrine  5 mg Oral TID    sodium chloride  250 mL IntraVENous Once    ferrous sulfate  325 mg Oral Daily with breakfast    sodium chloride flush  5-40 mL IntraVENous 2 times per day    atorvastatin  80 mg Oral Nightly    aspirin  81 mg Oral Daily    ticagrelor  90 mg Oral BID     CONTINUOUS INFUSIONS:   sodium chloride      sodium chloride      heparin 25,000 units in 0.9% sodium chloride 250 mL infusion Stopped (23 0559)    sodium chloride 125 mL/hr at 23 0846     PRN MEDICATIONS:   sodium chloride flush, sodium chloride, sodium chloride flush, sodium chloride, ondansetron **OR** ondansetron, polyethylene glycol, perflutren lipid microspheres, acetaminophen **OR** acetaminophen    VITALS:  Temperature Range: Temp: 98.5 °F (36.9 °C) Temp  Av.6 °F (37 °C)  Min: 98.5 °F (36.9 °C)  Max: 98.7 °F (37.1 °C)  BP Range: Systolic (27SPZ), PKS:798 , Min:115 , Max:160 Diastolic (15QCG), HYZ:86, Min:58, Max:89    Pulse Range: Pulse  Av.8  Min: 44  Max: 81  Respiration Range: Resp  Avg: 15.8  Min: 9  Max: 25  Current Pulse Ox: SpO2: 98 %  24HR Pulse Ox Range: SpO2  Av.3 %  Min: 94 %  Max: 100 %  Patient Vitals for the past 12 hrs:   BP Temp Temp src Pulse Resp SpO2   23 1300 (!) 150/71 -- -- 50 17 98 %   23 1200 130/74 98.5 °F (36.9 °C) Oral (!) 46 13 97 %   23 1124 136/76 -- -- (!) 48 15 99 %   23 1000 (!) 142/66 -- -- (!) 46 12 97 %   23 0900 134/72 -- -- (!) 47 13 96 %   23 0800 131/79 98.6 °F (37 °C) Oral 80 25 98 %   23 0700 129/70 -- -- 61 20 99 %   23 0600 (!) 160/79 -- -- 52 18 100 %   23 0500 127/68 -- -- 55 21 98 %   23 0400 (!) 124/58 98.7 °F (37.1 °C) Oral (!) 46 11 99 %   23 0300 118/63 -- -- (!) 46 15 98 %   23 0200 121/60 -- -- 51 12 96 %     Estimated body mass index is 26.63 kg/m² as calculated from the following:    Height as of this encounter: 5' 7\" (1.702 m).     Weight as of this encounter: 170 lb (77.1 kg).  []<16 Severe malnutrition  []16-16.99 Moderate malnutrition  []17-18.49 Mild malnutrition  []18.5-24.9 Normal  [x]25-29.9 Overweight (not obese)  []30-34.9 Obese class 1 (Low Risk)  []35-39.9 Obese class 2 (Moderate Risk)  []?40 Obese class 3 (High Risk)    RECENT LABS:   Lab Results   Component Value Date    WBC 6.4 2023    HGB 10.4 (L) 2023    HCT 31.4 (L) 2023     2023    CHOL 217 (H) 2023    TRIG 99 2023    HDL 51 2023    LDLCHOLESTEROL 146 (H) 2023     2023    K 4.5 2023     (H) 2023    CREATININE 1.07 2023    BUN 18 2023    CO2 16 (L) 2023    INR 1.0 2023    LABA1C 4.8 2023     24 HOUR INTAKE/OUTPUT:  Intake/Output Summary (Last 24 hours) at 2023 1317  Last data filed at 2023 0846  Gross per 24 hour   Intake 4929.71 ml   Output 1550 ml   Net 3379.71 ml       IMAGING:      Radiology Review:     1.) CT brain without contrast:  Impression   No acute intracranial abnormality. Remote left occipital lobe infarct. Findings were discussed with Dr. Angela Luong At 1:28 pm on 2/4/2023.             2. ) CTA Head/Neck:  Impression   Subtle linear hypodense focus in the proximal aspect of the right internal   carotid artery with subsequent tapering of flow, from minimal to no flow, in   the mid and distal right cervical internal carotid artery concerning for a   dissection. Alternatively this may be the result of a right carotid terminus   thrombus. Angiography may be helpful in further characterization. No significant flow within the intracranial portion of the right internal   carotid artery. No significant stenosis or occlusion within the remainder of the intracranial   vessels. Findings were discussed with Dr. Angela Luong At 1:37 pm on 2/4/2023. Labs and Images reviewed with:  [] Dr. Cedric Montgomery. Margarita    [x] Dr. Rocky Chavez  [] Dr. Lissa Lindsay  [] There are no new interval images to review. PHYSICAL EXAM       CONSTITUTIONAL:  Well developed, well nourished, alert and oriented x 3, in no acute distress. GCS 15. Nontoxic. No dysarthria. No aphasia.    HEAD:  normocephalic, atraumatic    EYES:  PERRLA, EOMI.   ENT:  moist mucous membranes   NECK:  supple, symmetric   LUNGS:  Equal air entry bilaterally   CARDIOVASCULAR:  normal s1 / s2, RRR, distal pulses intact   ABDOMEN:  Soft, no rigidity   NEUROLOGIC:  Mental Status:  A & O x3,awake             Cranial Nerves:    cranial nerves II-XII are grossly intact    Motor Exam:    Drift:  present - mild left upper extremity  Tone:  normal    Motor exam is 5 out of 5 all extremities with the exception of left upper extremity 4+/5 and left lower extremity 4+/5    Sensory:    Touch:    Right Upper Extremity:  normal  Left Upper Extremity:  abnormal - slightly decreased sensation compared to right, but improved from previous   Right Lower Extremity:  normal  Left Lower Extremity:  normal    Plantar Response:  Right:  downgoing  Left:  downgoing    Clonus:  absent  Traore's:  absent    Coordination/Dysmetria:  Heel to Shin:  Right:  normal  Left: Normal  Finger to Nose:   Right:  normal  Left: Due to weakness  Dysdiadochokinesia: Not tested    Gait: Not tested   NIH Stroke Scale Total (if not done complete detailed one below):    1a.  Level of consciousness:  0 - alert; keenly responsive  1b. Level of consciousness questions:  0 - answers both questions correctly  1c. Level of consciousness questions:  0 - performs both tasks correctly  2. Best Gaze:  0 - normal  3. Visual:  0 - no visual loss  4. Facial Palsy:  0 - normal symmetric movement  5a. Motor left arm:  1 - drift, limb holds 90 (or 45) degrees but drifts down before full 10 seconds: does not hit bed  5b. Motor right arm:  0 - no drift, limb holds 90 (or 45) degrees for full 10 seconds  6a. Motor left le - no drift; leg holds 30 degree position for full 5 seconds  6b. Motor right le - no drift; leg holds 30 degree position for full 5 seconds  7. Limb Ataxia:  0 - absent  8. Sensory:  0 - normal; no sensory loss  9. Best Language:  0 - no aphasia, normal  10. Dysarthria:  0 - normal  11. Extinction and Inattention:  0 - no abnormality  TOTAL: 2    DRAINS:  [x] There are no drains for Neuro Critical Care to monitor at this time. ASSESSMENT AND PLAN:       69-year-old male with with past medical history of hypertension (on amlodipine) presented with complaint of left-sided numbness and weakness. CTA concerning for right ICA tapering concerning for dissection versus thrombus. Fluctuation in NIH exam with improvement after midodrine and fluids. Admission to neuro critical care unit for close neurological exam monitoring and blood pressure goals. Started on dual antiplatelet and statin.      Left sided weakness and numbness  CTA concerning for Rt ICA thrombus and dissection. NEUROLOGIC:  - Imaging   CT head without contrast: No acute abnormality. Remote left occipital infarct. CTA head and neck with contrast: Subtle linear hypodense focus in the proximal aspect of right ICA concerning for dissection versus right carotid terminus thrombus. - Neuroendocvas: IR stenting in on   - Hemoglobin A1c 4.8  - Lipid profile: Cholesterol 217, HDL 51, , triglyceride 99  - Holter monitor at discharge  - Echo ordered  -Midodrine started  -Heparin bolus 2000 units x 1 followed by heparin GTT 15u/kg  -Aspirin 650 mg x 1 followed by 81 mg daily.  -Brilinta 180 mg x 1 followed by 90 mg twice daily. -Atorvastatin 40 mg nightly> increased to 80 mg based on lipid profile  - Goal -160  - Neuro checks per protocol      CARDIOVASCULAR:  BP Range: Systolic (83QUZ), HUX:574 , Min:115 , RHF:555     Diastolic (57UPX), NAZ, Min:58, Max:89    Pulse Range: Pulse  Av.8  Min: 40  Max: 81  - Right ICA dissection vs thrombus  - CTA H/N today: resolved occlusion of right ICA, moderate to severe stenosis upper portion of cavernous segment of right ICA and paraclinoid segment of right ICA   - Goal -160  - Continue telemetry  - Follow up Echo  - Continue ASA 81, brilinta, atorvastatin  - On Heparin gtt, held for procedure  - decreased midodrine     PULMONARY:    Respiration Range: Resp  Avg: 15.8  Min: 9  Max: 25  Current Pulse Ox: SpO2: 98 %  - On room air  - CXR: No acute cardiopulmonary process    RENAL/FLUID/ELECTROLYTE:    Lab Results   Component Value Date/Time     2023 10:26 AM    K 4.5 2023 10:26 AM     2023 10:26 AM    CO2 16 2023 10:26 AM    BUN 18 2023 10:26 AM    CREATININE 1.07 2023 10:26 AM    GLUCOSE 87 2023 10:26 AM    CALCIUM 8.8 2023 10:26 AM          I/O last 3 completed shifts:   In: 3089.8 [I.V.:3089.8]  Out: 8716 [Urine:2850; Stool:1]  I/O this shift:  In:  [I.V.:.]  Out: 350 [Urine:350]    - IVF: 125ml/hr  - Replace electrolytes PRN  - Daily BMP,repeat BMP due to elevated Cr     GI/NUTRITION:  NUTRITION:  Diet NPO Exceptions are: Sips of Water with Meds  - Bowel regimen: Not indicated  - GI prophylaxis: not indicated. Recent Labs     23  0459 23  0533 23  0703   WBC 6.4 8.0 8.3   HGB 10.4* 11.9* 13.5   HCT 31.4* 35.3* 38.5*   MCV 95.7 92.9 90.4    195 236       ID:  Temperature Range: Temp: 98.5 °F (36.9 °C) Temp  Av.6 °F (37 °C)  Min: 98.5 °F (36.9 °C)  Max: 98.7 °F (37.1 °C)    - Infectious work up unremarkable  - Continue to monitor for fevers  - Daily CBC    HEME:     - Daily CBC  - H/H downtrending, continue to monitor (no active signs of bleeding)    ENDOCRINE:  - Continue to monitor blood glucose, goal <180    OTHER:  - PT/OT/ST   - PMR consult post stenting   - Code Status: Full code    PROPHYLAXIS:  Stress ulcer: not indicated    DVT PROPHYLAXIS:  - SCD sleeves - Thigh High   Oh heparin. DISPOSITION:  [x] To remain ICU: Stenting planned for tomorrow   [] OK for out of ICU from Neuro Critical Care standpoint    We will continue to follow along. For any changes in exam or patient status please contact Neuro Critical Care.       Iraida Rodriguez MD  Neuro Critical Care  2023     1:17 PM

## 2023-02-08 NOTE — PROGRESS NOTES
Physical Therapy  Facility/Department: [address] NEURO ICU  Physical Therapy Daily Treatment Note    Name: Ulysses Sally  : 1950  MRN: [id number]  Date of Service: 2023    Discharge Recommendations:  Patient would benefit from continued therapy after discharge   PT Equipment Recommendations  Equipment Needed: Yes  Mobility Devices: Maryl Amas: Rolling  Other: Pt stated that it might be beneficial for mobility and safety if he were to aquire a walker for home. Patient Diagnosis(es): The encounter diagnosis was Left-sided weakness. Past Medical History:  has a past medical history of HTN (hypertension). Past Surgical History:  has no past surgical history on file. Assessment   Body Structures, Functions, Activity Limitations Requiring Skilled Therapeutic Intervention: Decreased endurance;Decreased functional mobility ; Decreased strength;Decreased balance;Decreased tolerance to work activity  Assessment: Pt able to completed bed mobility with SBA, ambulated 260ft with no AD and SBA. Pt completed 4 stairs with bilat handrails SBA. Pt would benefit from coantinued PT to regain functional independence, should be sfae to return home with supportive family.   Therapy Prognosis: Good  Requires PT Follow-Up: Yes  Activity Tolerance  Activity Tolerance: Patient tolerated treatment well     Plan   Physcial Therapy Plan  General Plan:  (5-6x/wk)  Current Treatment Recommendations: Strengthening, Balance training, Functional mobility training, Transfer training, Endurance training, Gait training, Stair training, Neuromuscular re-education, Home exercise program, Safety education & training, Patient/Caregiver education & training, Equipment evaluation, education, & procurement, Therapeutic activities  Safety Devices  Type of Devices: Bed alarm in place, Call light within reach, Gait belt, Left in bed, Nurse notified  Restraints  Restraints Initially in Place: No Restrictions  Restrictions/Precautions  Restrictions/Precautions: Up as Tolerated, General Precautions  Required Braces or Orthoses?: No  Position Activity Restriction  Other position/activity restrictions: 10lb lifting restriction until 2/8 at 1630. -180 mmHg. DSA 2/6. Subjective   Pain: pt. has no complaints of pain  General  Chart Reviewed: Yes  Patient assessed for rehabilitation services?: Yes  Response To Previous Treatment: Patient with no complaints from previous session. Family / Caregiver Present: Yes (son)  Follows Commands: Within Functional Limits  General Comment  Comments: Pt left in bed with call light within reach, alarm activated  Subjective  Subjective: Pt and RN agreeable to PT. Pt alert in bed upon arrival with son present in room, very pleasant and cooperative with treatment. Denies pain       Cognition   Orientation  Overall Orientation Status: Within Functional Limits  Orientation Level: Oriented X4  Cognition  Overall Cognitive Status: WFL     Objective   SpO2: 98 %  O2 Device: None (Room air)     Observation/Palpation  Posture: Good     Bed mobility  Supine to Sit: Stand by assistance  Sit to Supine: Stand by assistance  Scooting: Stand by assistance  Bed Mobility Comments: HOB elevated to approx 35 degrees  Transfers  Sit to Stand: Stand by assistance  Stand to Sit: Stand by assistance  Comment: STS performed w/o AD, and required no external support  Ambulation  Surface: Level tile  Device: No Device  Assistance: Stand by assistance  Quality of Gait: Pt amb with decreased gait speed, decreased step length KATALINA, no LOB. Gait Deviations: Decreased step length; Slow Haylee  Distance: 260'  More Ambulation?: No  Stairs/Curb  Stairs?: Yes  Stairs  # Steps : 4  Stairs Height: 6\"  Rails: Bilateral  Device: No Device  Assistance: Stand by assistance  Comment: reciprocal gait, no LOB     Balance  Posture: Good  Sitting - Static: Good  Sitting - Dynamic: Good  Standing - Static: Good;-  Standing - Dynamic: Fair;+  Comments: Standing balance assessed w/ no AD  Exercise Treatment:  Seated LE exercise program: Long Arc Quads, hip abduction/adduction, heel/toe raises, and marches. Reps: 10x     AM-PAC Score  AM-PAC Inpatient Mobility Raw Score : 20 (02/08/23 1439)  AM-PAC Inpatient T-Scale Score : 47.67 (02/08/23 1439)  Mobility Inpatient CMS 0-100% Score: 35.83 (02/08/23 1439)  Mobility Inpatient CMS G-Code Modifier : CJ (02/08/23 1439)    Goals  Short Term Goals  Time Frame for Short Term Goals: 14 visits  Short Term Goal 1: Pt will amb 300' IND  Short Term Goal 2: Pt will be IND in all bed mobility tasks  Short Term Goal 3: Pt will be IND w/ transfers  Short Term Goal 4: Pt will be [place] in negotation of 3 steps w/ Unilateral UE assist       Education  Patient Education  Education Given To: Patient; Family  Education Provided: Role of Therapy;Plan of Care;Home Exercise Program;Transfer Training;Family Education  Education Provided Comments: Issued HEP for seated and standing B LE exs as well as B UE exs with green theraband, all questions answered at this time  Education Method: Demonstration;Verbal;Printed Information/Hand-outs  Barriers to Learning: None  Education Outcome: Verbalized understanding;Demonstrated understanding      Therapy Time   Individual Concurrent Group Co-treatment   Time In 1000         Time Out 1025         Minutes 25         Timed Code Treatment Minutes: [address]

## 2023-02-08 NOTE — PLAN OF CARE
Problem: Discharge Planning  Goal: Discharge to home or other facility with appropriate resources  Outcome: Progressing     Problem: Skin/Tissue Integrity  Goal: Absence of new skin breakdown  Description: 1. Monitor for areas of redness and/or skin breakdown  2. Assess vascular access sites hourly  3. Every 4-6 hours minimum:  Change oxygen saturation probe site  4. Every 4-6 hours:  If on nasal continuous positive airway pressure, respiratory therapy assess nares and determine need for appliance change or resting period.   Outcome: Progressing     Problem: ABCDS Injury Assessment  Goal: Absence of physical injury  Outcome: Progressing     Problem: Safety - Adult  Goal: Free from fall injury  Outcome: Progressing     Problem: Chronic Conditions and Co-morbidities  Goal: Patient's chronic conditions and co-morbidity symptoms are monitored and maintained or improved  Outcome: Progressing     Problem: Pain  Goal: Verbalizes/displays adequate comfort level or baseline comfort level  Outcome: Progressing

## 2023-02-08 NOTE — PROGRESS NOTES
Speech Language Pathology  Speech Language Pathology  [facility]    Cognitive Treatment Note    Date: 2/8/2023  Patients Name: Abiodun Morales  MRN: [id number]  Diagnosis:   Patient Active Problem List   Diagnosis Code    Acute ischemic stroke [facility]) I63.9    ICAO (internal carotid artery occlusion), right I65.21    Left-sided weakness R53.1       Pain: Pt does not complain of pain. Cognitive Treatment    Treatment time: 1:05- 1:30      Subjective: [x] Alert [x] Cooperative     [] Confused     [] Agitated    [] Lethargic      Objective/Assessment:    Recall: Delayed recall of four units 12/16, increasing to 16/16 with minimal to moderate verbal cueing. Problem Solving/Reasoning: Comparing sentence content 8/10. Multiple definitions 15/18, increasing to 18/18 with minimal verbal cueing. Evaluating information yes/ no 15/15. Plan:  [x] Continue ST services    [] Discharge from ST:      Discharge recommendations: []  Further therapy recommended at discharge. The patient should be able to tolerate at least 3 hours of therapy per day over 5 days or 15 hours over 7 days. [x] Further therapy recommended at discharge. [] No therapy recommended at discharge. Treatment completed by: Completed by: Madelyn Guadarrama  Clinician    Cosigned By: Justin Mckenzie S.CCC/SLP

## 2023-02-08 NOTE — CARE COORDINATION
Transitional Planning  Spoke with a patient and spouse, denies need for home care. States son is going to be available to provide assistance. Has transportation, denies further need. [address] Flow/Interdisciplinary Rounds Progress Note    Quality Flow Rounds held on February 8, 2023 at [address] Attending:  Bedside Nurse, , and Nursing Unit Leadership    Barriers to Discharge: na     Anticipated Discharge Date:  2-11-23     Anticipated Discharge Disposition: Home     Readmission Risk              Risk of Unplanned Readmission:  10           Discussed patient goal for the day, patient clinical progression, and barriers to discharge. The following Goal(s) of the Day/Commitment(s) have been identified:  Referral - Home Care patient refused. Stent placement.        Kim Marcum RN  February 8, 2023

## 2023-02-08 NOTE — PROGRESS NOTES
Physical Therapy  Facility/Department: [address] NEURO ICU  Physical Therapy Initial Assessment    Name: Corwin Luong  : 1950  MRN: [id number]  Date of Service: 2023    Discharge Recommendations:             Patient Diagnosis(es): The encounter diagnosis was Left-sided weakness. Past Medical History:  has a past medical history of HTN (hypertension). Past Surgical History:  has no past surgical history on file. Assessment   Body Structures, Functions, Activity Limitations Requiring Skilled Therapeutic Intervention: Decreased endurance  Assessment: Pt amb 260' SBA w/o AD w/o external support. Does not use an AD at baseline. Pt would benefit from continued acute PT to address deficits. Therapy Prognosis: Good  Requires PT Follow-Up: Yes  Activity Tolerance  Activity Tolerance: Patient tolerated treatment well     Plan   Physcial Therapy Plan  General Plan:  (5-6x/wk)  Current Treatment Recommendations: Strengthening, Balance training, Functional mobility training, Transfer training, Endurance training, Gait training, Stair training, Neuromuscular re-education, Home exercise program, Safety education & training, Patient/Caregiver education & training, Equipment evaluation, education, & procurement, Therapeutic activities  Safety Devices  Type of Devices: Bed alarm in place, Call light within reach, Gait belt, Left in bed  Restraints  Restraints Initially in Place: No     Restrictions  Restrictions/Precautions  Restrictions/Precautions: Up as Tolerated, General Precautions  Required Braces or Orthoses?: No  Position Activity Restriction  Other position/activity restrictions: 10lb lifting restriction until  at 1630. -180 mmHg. DSA 2/. Subjective   Pain: pt. has no complaints of pain  General  Chart Reviewed: Yes  Patient assessed for rehabilitation services?: Yes  Response To Previous Treatment: Patient with no complaints from previous session.   Family / Caregiver Present: Yes  Follows Commands: Within Functional Limits  General Comment  Comments: RN and pt agreeable to PT. pt alert in bed upon arrival.  Subjective  Subjective: Pt denies pain or lightheadedness         Social/Functional History  Social/Functional History  Lives With: Alone  Type of Home: House  Home Layout: One level  Home Access: Stairs to enter with rails  Entrance Stairs - Number of Steps: 3  Entrance Stairs - Rails: Both  Bathroom Shower/Tub: Tub/Shower unit  Bathroom Toilet: Standard  Bathroom Equipment: Grab bars in shower (non-slip mat)  Bathroom Accessibility: Accessible  Has the patient had two or more falls in the past year or any fall with injury in the past year?: No  Receives Help From: Family  ADL Assistance: [address] [place] [address] Avenue: Independent  Homemaking Responsibilities: Yes  Ambulation Assistance: Independent  Transfer Assistance: Independent  Active : Yes  Mode of Transportation: Car, Truck  Occupation: Part time employment (\"semi-retired\")  Type of Occupation:  at [employer]  Additional Comments: Pt reports 2 Retired sisters nearby, Son will be in town for 2 weeks to help out. 24/7 assist  Vision/Hearing       Cognition   Orientation  Orientation Level: Oriented X4  Cognition  Overall Cognitive Status: WNL  Arousal/Alertness: Appropriate responses to stimuli  Following Commands:  Follows all commands without difficulty  Attention Span: Appears intact  Memory: Appears intact  Problem Solving: Able to problem solve independently  Insights: Fully aware of deficits  Initiation: Does not require cues  Sequencing: Does not require cues     Objective   Heart Rate: (!) 48  BP: 136/76  MAP (Calculated): 96  Resp: 15  SpO2: 99 %  O2 Device: None (Room air)     Observation/Palpation  Posture: Good                       Bed mobility  Rolling to Right: Independent  Supine to Sit: Stand by assistance  Sit to Supine: Stand by assistance  Scooting: Independent  Transfers  Sit to Stand: Stand by assistance  Stand to Sit: Stand by assistance  Comment: TF performed w/o AD, and required no external support  Ambulation  Surface: Level tile  Device: No Device  Assistance: Stand by assistance  Quality of Gait: Pt amb with decreased gait speed, decreased step length KATALINA, no LOB. Gait Deviations: Decreased step length; Slow Haylee  Distance: 260'  Stairs/Curb  Stairs?: Yes  Stairs  # Steps : 4  Stairs Height: 6\"  Rails: Bilateral  Device: No Device  Assistance: Stand by assistance  Comment: Pt. tolerated tx with no difficulties     Balance  Posture: Good  Sitting - Static: Good  Sitting - Dynamic: Good  Standing - Static: Good  Standing - Dynamic: Good;-  Comments: Standing balance assessed w/ no AD  Exercise Treatment: Pt. amb 260ft w/o AD SBA. Basic LE and UE exercises. Pt. tolerated tx w/o any issues        OutComes Score                                                  AM-PAC Score             Tinneti Score       Goals  Short Term Goals  Time Frame for Short Term Goals: 14 visits  Short Term Goal 1: Pt will amb 300' IND  Short Term Goal 2: Pt will be IND in all bed mobility tasks  Short Term Goal 3: Pt will be IND w/ transfers  Short Term Goal 4: Pt will be Tyrone in negotation of 3 steps w/ Unilateral UE assist       Education  Patient Education  Education Given To: Patient; Family  Education Provided: Role of Therapy;Plan of Care;Home Exercise Program;Transfer Training  Education Method: Demonstration;Verbal  Barriers to Learning: None  Education Outcome: Verbalized understanding;Demonstrated understanding      Therapy Time   Individual Concurrent Group Co-treatment   Time In           Time Out           Minutes                   Bridgette Murphy

## 2023-02-08 NOTE — PROGRESS NOTES
Endovascular Neurosurgery Progress Note      Reason for evaluation: R ICA occlusion     SUBJECTIVE:     No acute events overnight, patient denied any headache, weakness or numbness     Review of Systems:  CONSTITUTIONAL:  negative for fevers, chills, fatigue and malaise    EYES:  negative for double vision, blurred vision and photophobia     HEENT:  negative for tinnitus, epistaxis and sore throat    RESPIRATORY:  negative for cough, shortness of breath, wheezing    CARDIOVASCULAR:  negative for chest pain, palpitations, syncope, edema    GASTROINTESTINAL:  negative for nausea, vomiting    GENITOURINARY:  negative for incontinence    MUSCULOSKELETAL:  negative for neck or back pain    NEUROLOGICAL:  Negative for weakness and tingling  negative for headaches and dizziness    PSYCHIATRIC:  negative for anxiety      Review of systems otherwise negative. OBJECTIVE:     Vitals:    02/08/23 0700   BP: 129/70   Pulse: 61   Resp: 20   Temp:    SpO2: 99%        General:  Gen: normal habitus, NAD  HEENT: NCAT, mucosa moist  Cvs: RRR, S1 S2 normal  Resp: symmetric unlabored breathing  Abd: s/nd/nt  Ext: no edema  Skin: no lesions seen, warm and dry    Neuro:  Gen: awake and alert, oriented x4. Lang/speech: no aphasia and dysarthria. Follows commands. CN: PERRL, EOMI, VFF, V1-3 intact, left face droop, hearing intact, shoulder shrug symmetric, tongue midline  Motor: grossly 5/5 UE and LE no drift  Sense:normal on the right, no sensation on the left  Coord: normal FTN and HTS  DTR: deferred  Gait: deferred    NIH Stroke Scale:   1a  Level of consciousness: 0 - alert; keenly responsive   1b. LOC questions:  0 - answers both questions correctly   1c. LOC commands: 0 - performs both tasks correctly   2. Best Gaze: 0 - normal   3. Visual: 0 - no visual loss   4. Facial Palsy: 0 - normal symmetric movement   5a. Motor left arm: 0   5b.   Motor right arm: 0 - no drift, limb holds 90 (or 45) degrees for full 10 seconds 6a. Motor left le - no drift; leg holds 30 degree position for full 5 seconds   6b  Motor right le - no drift; leg holds 30 degree position for full 5 seconds   7. Limb Ataxia: 0 - absent   8. Sensory: 1 - mild to moderate sensory loss; patient feels pinprick is less sharp or is dull on the affected side; there is a loss of superficial pain with pinprick but patient is aware of being touched    9. Best Language:  0 - no aphasia, normal   10. Dysarthria: 0 - normal   11. Extinction and Inattention: 1 - visual, tactile, auditory, spatial or personal inattention or extinction to bilateral simultaneous stimulation in one of the sensory modalities          Total:   2     MRS: 2      LABS:   Reviewed. Lab Results   Component Value Date    HGB 10.4 (L) 2023    WBC 6.4 2023     2023     2023    BUN 21 2023    CREATININE 1.24 (H) 2023    MG 1.9 2023    APTT 62.3 (H) 2023    INR 1.0 2023      No results found for: COVID19    RADIOLOGY:   Images were personally reviewed including:    CTA head and neck 23:       CTA head neck 23            MRI brain w/o con on 23: right posterior frontal parietal lobe acute stroke      ASSESSMENT:     75year old man with HTN, p/w acute onset of left hemiparesis, and found to have R ICA occlusion with right posterior frontal-parietal lobe acute stroke. Patient's symptoms significantly improved over last 2 days.  Repeat CTA head and neck on  showed complete recanalization of the right intracranial ICA occlusion, with no evidence of large vessel occlusion   DSA completed on  which showed Right ICA supraclinoid segment stenosis with possible super-imposed thrombus    PLAN:   - Right ICA endovascular stenting today  - con't aspirin 81mg daily  - con't brillinta 90mg BID  - Heparin on hold today for procedure   - con't lipitor 80mg daily  - -160 mm Hg  - PT/OT       Case discussed with  Yared Will attending.     Primo Guerrero MD  Stroke, [facility]  [facility]  Electronically signed 2/8/2023 at 8:26 AM

## 2023-02-09 VITALS
SYSTOLIC BLOOD PRESSURE: 140 MMHG | OXYGEN SATURATION: 98 % | HEIGHT: 67 IN | DIASTOLIC BLOOD PRESSURE: 85 MMHG | TEMPERATURE: 98.6 F | HEART RATE: 56 BPM | RESPIRATION RATE: 15 BRPM | WEIGHT: 170 LBS | BODY MASS INDEX: 26.68 KG/M2

## 2023-02-09 LAB
ABSOLUTE EOS #: 0.06 K/UL (ref 0–0.44)
ABSOLUTE IMMATURE GRANULOCYTE: 0.06 K/UL (ref 0–0.3)
ABSOLUTE LYMPH #: 1.07 K/UL (ref 1.1–3.7)
ABSOLUTE MONO #: 0.79 K/UL (ref 0.1–1.2)
ANION GAP SERPL CALCULATED.3IONS-SCNC: 12 MMOL/L (ref 9–17)
BASOPHILS # BLD: 0 % (ref 0–2)
BASOPHILS ABSOLUTE: <0.03 K/UL (ref 0–0.2)
BUN SERPL-MCNC: 12 MG/DL (ref 8–23)
CALCIUM SERPL-MCNC: 8.8 MG/DL (ref 8.6–10.4)
CHLORIDE SERPL-SCNC: 107 MMOL/L (ref 98–107)
CO2 SERPL-SCNC: 19 MMOL/L (ref 20–31)
CREAT SERPL-MCNC: 1.05 MG/DL (ref 0.7–1.2)
EOSINOPHILS RELATIVE PERCENT: 1 % (ref 1–4)
GFR SERPL CREATININE-BSD FRML MDRD: >60 ML/MIN/1.73M2
GLUCOSE SERPL-MCNC: 93 MG/DL (ref 70–99)
HCT VFR BLD AUTO: 32.3 % (ref 40.7–50.3)
HGB BLD-MCNC: 11.2 G/DL (ref 13–17)
IMMATURE GRANULOCYTES: 1 %
LYMPHOCYTES # BLD: 12 % (ref 24–43)
MCH RBC QN AUTO: 31.7 PG (ref 25.2–33.5)
MCHC RBC AUTO-ENTMCNC: 34.7 G/DL (ref 28.4–34.8)
MCV RBC AUTO: 91.5 FL (ref 82.6–102.9)
MONOCYTES # BLD: 9 % (ref 3–12)
NRBC AUTOMATED: 0 PER 100 WBC
PDW BLD-RTO: 11.7 % (ref 11.8–14.4)
PLATELET # BLD AUTO: 196 K/UL (ref 138–453)
PMV BLD AUTO: 10.1 FL (ref 8.1–13.5)
POTASSIUM SERPL-SCNC: 3.5 MMOL/L (ref 3.7–5.3)
RBC # BLD: 3.53 M/UL (ref 4.21–5.77)
SEG NEUTROPHILS: 77 % (ref 36–65)
SEGMENTED NEUTROPHILS ABSOLUTE COUNT: 6.7 K/UL (ref 1.5–8.1)
SODIUM SERPL-SCNC: 138 MMOL/L (ref 135–144)
WBC # BLD AUTO: 8.7 K/UL (ref 3.5–11.3)

## 2023-02-09 PROCEDURE — 6370000000 HC RX 637 (ALT 250 FOR IP): Performed by: PSYCHIATRY & NEUROLOGY

## 2023-02-09 PROCEDURE — 85025 COMPLETE CBC W/AUTO DIFF WBC: CPT

## 2023-02-09 PROCEDURE — 2580000003 HC RX 258: Performed by: PSYCHIATRY & NEUROLOGY

## 2023-02-09 PROCEDURE — 93271 ECG/MONITORING AND ANALYSIS: CPT

## 2023-02-09 PROCEDURE — 80048 BASIC METABOLIC PNL TOTAL CA: CPT

## 2023-02-09 PROCEDURE — 6370000000 HC RX 637 (ALT 250 FOR IP): Performed by: STUDENT IN AN ORGANIZED HEALTH CARE EDUCATION/TRAINING PROGRAM

## 2023-02-09 PROCEDURE — 97129 THER IVNTJ 1ST 15 MIN: CPT

## 2023-02-09 PROCEDURE — 36415 COLL VENOUS BLD VENIPUNCTURE: CPT

## 2023-02-09 PROCEDURE — 99233 SBSQ HOSP IP/OBS HIGH 50: CPT | Performed by: PSYCHIATRY & NEUROLOGY

## 2023-02-09 PROCEDURE — 97130 THER IVNTJ EA ADDL 15 MIN: CPT

## 2023-02-09 PROCEDURE — 99238 HOSP IP/OBS DSCHRG MGMT 30/<: CPT | Performed by: PSYCHIATRY & NEUROLOGY

## 2023-02-09 PROCEDURE — 93270 REMOTE 30 DAY ECG REV/REPORT: CPT

## 2023-02-09 RX ORDER — POTASSIUM CHLORIDE 20 MEQ/1
40 TABLET, EXTENDED RELEASE ORAL ONCE
Status: COMPLETED | OUTPATIENT
Start: 2023-02-09 | End: 2023-02-09

## 2023-02-09 RX ORDER — ATORVASTATIN CALCIUM 80 MG/1
80 TABLET, FILM COATED ORAL NIGHTLY
Qty: 30 TABLET | Refills: 3 | Status: SHIPPED | OUTPATIENT
Start: 2023-02-09

## 2023-02-09 RX ORDER — LANOLIN ALCOHOL/MO/W.PET/CERES
325 CREAM (GRAM) TOPICAL
Qty: 90 TABLET | Refills: 3 | Status: SHIPPED | OUTPATIENT
Start: 2023-02-10

## 2023-02-09 RX ORDER — ASPIRIN 81 MG/1
81 TABLET, CHEWABLE ORAL DAILY
Qty: 30 TABLET | Refills: 3 | Status: SHIPPED | OUTPATIENT
Start: 2023-02-10

## 2023-02-09 RX ADMIN — TICAGRELOR 90 MG: 90 TABLET ORAL at 08:30

## 2023-02-09 RX ADMIN — FERROUS SULFATE TAB EC 325 MG (65 MG FE EQUIVALENT) 325 MG: 325 (65 FE) TABLET DELAYED RESPONSE at 08:31

## 2023-02-09 RX ADMIN — ASPIRIN 81 MG: 81 TABLET, CHEWABLE ORAL at 08:31

## 2023-02-09 RX ADMIN — POTASSIUM CHLORIDE 40 MEQ: 1500 TABLET, EXTENDED RELEASE ORAL at 08:30

## 2023-02-09 RX ADMIN — SODIUM CHLORIDE, PRESERVATIVE FREE 10 ML: 5 INJECTION INTRAVENOUS at 08:31

## 2023-02-09 ASSESSMENT — PAIN SCALES - GENERAL
PAINLEVEL_OUTOF10: 0
PAINLEVEL_OUTOF10: 0

## 2023-02-09 NOTE — PROGRESS NOTES
Daily Progress Note  Neuro Critical Care    Patient Name: Mahnaz Leslie  Patient : 1950  Room/Bed: 0127/0127-01  Code Status: Full code  Allergies: No Known Allergies    CHIEF COMPLAINT:       Left-sided weakness. INTERVAL HISTORY      Initial Presentation (Admitted 2023):     69-year-old male with with past medical history of hypertension (on amlodipine) presented with complaint of left-sided numbness and weakness. As per patient, at around 11 AM he started feeling numb in the left upper extremity (going from shoulder down to the hand) along with weakness and having some loss of dexterity experienced while using his hand. He denies having any weakness in the left lower extremity but later on in the ED felt that left leg does not feel right. We called his sister and was brought to ER at [facility] with concern of stroke. On arrival to [facility]'s as BP was 138/68, . Patient had taken his morning amlodipine (unclear dosage) for hypertension. He denied using any antiplatelet/anticoagulation or statin. No history of smoking and drinks beer 2 times a week. On initial evaluation by stroke team, patient initially had NIH of 2 (left arm weakness and numbness). CT head without contrast: No acute abnormality. Remote left occipital infarct. CTA head and neck with contrast: Subtle linear hypodense focus in the proximal aspect of right ICA concerning for dissection versus right carotid terminus thrombus. Later on progressed to [place] of 8 (facial palsy, left arm, left leg, limb ataxia and sensory and dysarthria). Patient was given IV fluid 2 L and had improvement in exam again and NIH improved to 3. Due to pressure related changes favoring improved stenosis decided not to be obtained. And patient may benefit with stents with close ICU monitoring. An emergent thrombectomy for now.   Started on heparin, midodrine, aspirin, Brilinta and statin     On my eval, patient was awake alert and oriented x3 without any dysarthria or aphasia. No facial asymmetry noticed. No visual deficit noticed. Patient had numbness on the left side of the face left arm and left leg with difficulty appreciating sharp versus dull. Left upper extremity drift noticed. Left lower extremity 4/5 but no drift noticed. Patient and his sister was counseled about the management plan admission to the hospital in the ICU for close monitoring and further work-up. : Art line placed. Received midodrine, 1L NS due to low SBP. Improved left sided symptoms, especially numbness. NIH 2 (numbness, drift in LUE). Consent obtained by neuro endovascular team for ICA stenting : Weaned off dopamine, symptoms improving, taken for DSA yesterday which showed supraclinoid stenosis w clot, plan for stent. : off dopamine, pressures maintaining with unchanged exam. Started on midodrine  : Heparin gtt discontinued     Last 24h:   No acute events overnight. Has no complaints, states he is ready to go home.      CURRENT MEDICATIONS:  SCHEDULED MEDICATIONS:   sodium chloride  250 mL IntraVENous Once    ferrous sulfate  325 mg Oral Daily with breakfast    sodium chloride flush  5-40 mL IntraVENous 2 times per day    atorvastatin  80 mg Oral Nightly    aspirin  81 mg Oral Daily    ticagrelor  90 mg Oral BID     CONTINUOUS INFUSIONS:   sodium chloride      sodium chloride       PRN MEDICATIONS:   loperamide, sodium chloride flush, sodium chloride, sodium chloride flush, sodium chloride, ondansetron **OR** ondansetron, polyethylene glycol, perflutren lipid microspheres, acetaminophen **OR** acetaminophen    VITALS:  Temperature Range: Temp: 98.7 °F (37.1 °C) Temp  Av.5 °F (36.9 °C)  Min: 98.3 °F (36.8 °C)  Max: 98.7 °F (37.1 °C)  BP Range: Systolic (94HJP), NXN:088 , Min:114 , CMS:194     Diastolic (49FKB), GXW:06, Min:56, Max:102    Pulse Range: Pulse  Av.1  Min: 46  Max: 75  Respiration Range: Resp  Av  Min: 12  Max: 19  Current Pulse Ox: SpO2: 97 %  24HR Pulse Ox Range: SpO2  Av.3 %  Min: 95 %  Max: 99 %  Patient Vitals for the past 12 hrs:   BP Temp Temp src Pulse Resp SpO2   23 1015 (!) 157/77 -- -- 56 16 97 %   23 0900 -- -- -- 55 17 97 %   23 0800 (!) 131/102 98.7 °F (37.1 °C) Oral 75 16 96 %   23 0700 126/60 -- -- 53 15 97 %   23 0600 132/68 -- -- 50 12 97 %   23 0500 (!) 143/68 -- -- 50 15 96 %   23 0400 (!) 114/56 98.3 °F (36.8 °C) Oral 50 13 96 %   23 0300 126/60 -- -- 52 13 96 %   23 0200 128/72 -- -- (!) 48 16 97 %   23 0100 (!) 140/81 -- -- 66 17 97 %   23 0000 130/67 98.3 °F (36.8 °C) Oral 60 18 95 %   23 2300 128/69 -- -- 55 17 97 %     Estimated body mass index is 26.63 kg/m² as calculated from the following:    Height as of this encounter: 5' 7\" (1.702 m). Weight as of this encounter: 170 lb (77.1 kg).  []<16 Severe malnutrition  []16-16.99 Moderate malnutrition  []17-18.49 Mild malnutrition  []18.5-24.9 Normal  [x]25-29.9 Overweight (not obese)  []30-34.9 Obese class 1 (Low Risk)  []35-39.9 Obese class 2 (Moderate Risk)  []?40 Obese class 3 (High Risk)    RECENT LABS:   Lab Results   Component Value Date    WBC 8.7 2023    HGB 11.2 (L) 2023    HCT 32.3 (L) 2023     2023    CHOL 217 (H) 2023    TRIG 99 2023    HDL 51 2023    LDLCHOLESTEROL 146 (H) 2023     2023    K 3.5 (L) 2023     2023    CREATININE 1.05 2023    BUN 12 2023    CO2 19 (L) 2023    INR 1.0 2023    LABA1C 4.8 2023     24 HOUR INTAKE/OUTPUT:  Intake/Output Summary (Last 24 hours) at 2023 1052  Last data filed at 2023 0617  Gross per 24 hour   Intake 1913.83 ml   Output 650 ml   Net 1263.83 ml       IMAGING:      Radiology Review:     1.) CT brain without contrast:  Impression   No acute intracranial abnormality.        Remote left occipital lobe infarct. Findings were discussed with Dr. Analia Garcia At 1:28 pm on 2/4/2023.             2. ) CTA Head/Neck:  Impression   Subtle linear hypodense focus in the proximal aspect of the right internal   carotid artery with subsequent tapering of flow, from minimal to no flow, in   the mid and distal right cervical internal carotid artery concerning for a   dissection. Alternatively this may be the result of a right carotid terminus   thrombus. Angiography may be helpful in further characterization. No significant flow within the intracranial portion of the right internal   carotid artery. No significant stenosis or occlusion within the remainder of the intracranial   vessels. Findings were discussed with Dr. Analia Garcia At 1:37 pm on 2/4/2023. Labs and Images reviewed with:  [] Dr. Miguelito Avila    [x] Dr. Valeriano Srivastava  [] Dr. Chelsy Cline  [] There are no new interval images to review. PHYSICAL EXAM       CONSTITUTIONAL:  Well developed, well nourished, alert and oriented x 3, in no acute distress. GCS 15. Nontoxic. No dysarthria. No aphasia.    HEAD:  normocephalic, atraumatic    EYES:  PERRLA, EOMI.   ENT:  moist mucous membranes   NECK:  supple, symmetric   LUNGS:  Equal air entry bilaterally   CARDIOVASCULAR:  normal s1 / s2, RRR, distal pulses intact   ABDOMEN:  Soft, no rigidity   NEUROLOGIC:  Mental Status:  A & O x3,awake             Cranial Nerves:    cranial nerves II-XII are grossly intact    Motor Exam:    Drift:  present - mild left upper extremity  Tone:  normal    Motor exam is 5 out of 5 all extremities with the exception of left upper extremity 4+/5 and left lower extremity 4+/5    Sensory:    Touch:    Right Upper Extremity:  normal  Left Upper Extremity:  abnormal - slightly decreased sensation compared to right, but improved from previous   Right Lower Extremity:  normal  Left Lower Extremity:  normal    Plantar Response:  Right:  downgoing  Left: downgoing    Clonus:  absent  Traore's:  absent    Coordination/Dysmetria:  Heel to Shin:  Right:  normal  Left: Normal  Finger to Nose:   Right:  normal  Left: Due to weakness  Dysdiadochokinesia: Not tested    Gait: Not tested   NIH Stroke Scale Total (if not done complete detailed one below):    1a.  Level of consciousness:  0 - alert; keenly responsive  1b. Level of consciousness questions:  0 - answers both questions correctly  1c. Level of consciousness questions:  0 - performs both tasks correctly  2. Best Gaze:  0 - normal  3. Visual:  0 - no visual loss  4. Facial Palsy:  0 - normal symmetric movement  5a. Motor left arm:  1 - drift, limb holds 90 (or 45) degrees but drifts down before full 10 seconds: does not hit bed  5b. Motor right arm:  0 - no drift, limb holds 90 (or 45) degrees for full 10 seconds  6a. Motor left le - no drift; leg holds 30 degree position for full 5 seconds  6b. Motor right le - no drift; leg holds 30 degree position for full 5 seconds  7. Limb Ataxia:  0 - absent  8. Sensory:  0 - normal; no sensory loss  9. Best Language:  0 - no aphasia, normal  10. Dysarthria:  0 - normal  11. Extinction and Inattention:  0 - no abnormality  TOTAL: 2    DRAINS:  [x] There are no drains for Neuro Critical Care to monitor at this time. ASSESSMENT AND PLAN:       76-year-old male with with past medical history of hypertension (on amlodipine) presented with complaint of left-sided numbness and weakness. CTA concerning for right ICA tapering concerning for dissection versus thrombus. Fluctuation in NIH exam with improvement after midodrine and fluids. Admission to neuro critical care unit for close neurological exam monitoring and blood pressure goals. Started on dual antiplatelet and statin. Left sided weakness and numbness  CTA concerning for Rt ICA thrombus and dissection.        NEUROLOGIC:  - Imaging   -CT head without contrast: No acute abnormality. Remote left occipital infarct. -CTA head and neck with contrast: Subtle linear hypodense focus in the proximal aspect of right -ICA concerning for dissection versus right carotid terminus thrombus. - Neuroendocvas: IR stenting in on 2/15, as outpatient  - Hemoglobin A1c 4.8  - Lipid profile: Cholesterol 217, HDL 51, , triglyceride 99  - Holter monitor at discharge  - Echo ordered  -Aspirin 650 mg x 1 followed by 81 mg daily.  -Brilinta 180 mg x 1 followed by 90 mg twice daily. -Atorvastatin 40 mg nightly> increased to 80 mg based on lipid profile  - Goal -160  - Neuro checks per protocol      CARDIOVASCULAR:  BP Range: Systolic (11UIX), SDA:390 , Min:114 , PMB:261     Diastolic (36FPV), JASWINDER:97, Min:56, Max:102    Pulse Range: Pulse  Av.1  Min: 55  Max: 75  - Right ICA dissection vs thrombus  - CTA H/N today: resolved occlusion of right ICA, moderate to severe stenosis upper portion of cavernous segment of right ICA and paraclinoid segment of right ICA   - Goal -160  - Continue telemetry  - Echo outpatient  - Continue ASA 81, brilinta, atorvastatin  - discontinue midodrine and heparin     PULMONARY:    Respiration Range: Resp  Av  Min: 12  Max: 19  Current Pulse Ox: SpO2: 97 %  - On room air  - CXR: No acute cardiopulmonary process    RENAL/FLUID/ELECTROLYTE:    Lab Results   Component Value Date/Time     2023 02:59 AM    K 3.5 2023 02:59 AM     2023 02:59 AM    CO2 19 2023 02:59 AM    BUN 12 2023 02:59 AM    CREATININE 1.05 2023 02:59 AM    GLUCOSE 93 2023 02:59 AM    CALCIUM 8.8 2023 02:59 AM          I/O last 3 completed shifts: In: 3886.9 [P.O.:625; I.V.:3261.9]  Out: 2200 [Urine:2200]  No intake/output data recorded. - Replace electrolytes PRN  - Daily BMP  GI/NUTRITION:  NUTRITION:  ADULT DIET; Regular  - Bowel regimen: Not indicated  - GI prophylaxis: not indicated.     Recent Labs     23  0253 23  0459 23  0533   WBC 8.7 6.4 8.0   HGB 11.2* 10.4* 11.9*   HCT 32.3* 31.4* 35.3*   MCV 91.5 95.7 92.9    175 195       ID:  Temperature Range: Temp: 98.7 °F (37.1 °C) Temp  Av.5 °F (36.9 °C)  Min: 98.3 °F (36.8 °C)  Max: 98.7 °F (37.1 °C)    - Infectious work up unremarkable  - Continue to monitor for fevers  - Daily CBC    HEME:     - Daily CBC  - H/H downtrending, continue to monitor (no active signs of bleeding)    ENDOCRINE:  - Continue to monitor blood glucose, goal <180    OTHER:  - PT/OT/ST   - PMR consult post stenting   - Code Status: Full code    PROPHYLAXIS:  Stress ulcer: not indicated    DVT PROPHYLAXIS:  - SCD sleeves - Thigh High       DISPOSITION:  [] To remain ICU:   [x] OK for out of ICU from Neuro Critical Care standpoint, will discharge, out patient stent     We will continue to follow along. For any changes in exam or patient status please contact Neuro Critical Care.       Gordo Hemphill MD  Neuro Critical Care  2023     10:52 AM

## 2023-02-09 NOTE — PROGRESS NOTES
Speech Language Pathology  Speech Language Pathology  Laren Gottron    Cognitive Treatment Note    Date: 2/9/2023  Patients Name: Rivka Valdivia  MRN: [id number]  Diagnosis:   Patient Active Problem List   Diagnosis Code    Acute ischemic stroke [facility]) I63.9    ICAO (internal carotid artery occlusion), right I65.21    Left-sided weakness R53.1       Pain: Pt does not complain of pain. Cognitive Treatment    Treatment time: 10:34- 11:00      Subjective: [x] Alert [x] Cooperative     [] Confused     [] Agitated    [] Lethargic      Objective/Assessment:    Recall: Functional memory tasks paragraphs with 3-4 elements- inclusion 11/14, increasing to 14/14 with mild verbal cueing. Problem Solving/Reasoning: Multiple uses for objects 19/20, increasing to 20/20 with mild verbal cueing. Inferences about paragraphs 7/10, increasing to 10/10 with minimal verbal cueing. Problem solving- missing equipment 12/15, increasing to 15/15 with minimal verbal cueing. State the opposite 10/15, increasing to 14/15 with minimal verbal cueing. Plan:  [x] Continue ST services    [] Discharge from ST:      Discharge recommendations: []  Further therapy recommended at discharge. The patient should be able to tolerate at least 3 hours of therapy per day over 5 days or 15 hours over 7 days. [x] Further therapy recommended at discharge. [] No therapy recommended at discharge. Treatment completed by: Completed by: Hallie Rdz  Clinician    Cosigned By: Elissa Márquez S.CCC/SLP

## 2023-02-09 NOTE — PLAN OF CARE
Problem: Discharge Planning  Goal: Discharge to home or other facility with appropriate resources  2/9/2023 0727 by Braulio Girard RN  Outcome: Progressing  2/9/2023 0423 by Jett Pfeiffer RN  Outcome: Progressing     Problem: Skin/Tissue Integrity  Goal: Absence of new skin breakdown  Description: 1. Monitor for areas of redness and/or skin breakdown  2. Assess vascular access sites hourly  3. Every 4-6 hours minimum:  Change oxygen saturation probe site  4. Every 4-6 hours:  If on nasal continuous positive airway pressure, respiratory therapy assess nares and determine need for appliance change or resting period.   2/9/2023 0727 by Braulio Girard RN  Outcome: Progressing  2/9/2023 0423 by Jett Pfeiffer RN  Outcome: Progressing     Problem: ABCDS Injury Assessment  Goal: Absence of physical injury  2/9/2023 0727 by Braulio Girard RN  Outcome: Progressing  2/9/2023 0423 by Jett Pfeiffer RN  Outcome: Progressing     Problem: Safety - Adult  Goal: Free from fall injury  2/9/2023 0727 by Braulio Girard RN  Outcome: Progressing  2/9/2023 0423 by Jett Pfeiffer RN  Outcome: Progressing     Problem: Chronic Conditions and Co-morbidities  Goal: Patient's chronic conditions and co-morbidity symptoms are monitored and maintained or improved  2/9/2023 0727 by Braulio Girard RN  Outcome: Progressing  2/9/2023 0423 by Jett Pfeiffer RN  Outcome: Progressing     Problem: Pain  Goal: Verbalizes/displays adequate comfort level or baseline comfort level  2/9/2023 0727 by Braulio Girard RN  Outcome: Progressing  2/9/2023 0423 by Jett Pfeiffer RN  Outcome: Progressing  Flowsheets (Taken 2/8/2023 1600 by Braulio Girard RN)  Verbalizes/displays adequate comfort level or baseline comfort level:   Encourage patient to monitor pain and request assistance   Assess pain using appropriate pain scale

## 2023-02-09 NOTE — DISCHARGE SUMMARY
Neuro Critical Care   Discharge Summary      PATIENT NAME: Rivka Valdivia  YOB: 1950  MEDICAL RECORD NO. [id number]  DATE: 2/9/2023  PRIMARY CARE PHYSICIAN: Ariana Clark MD  DISCHARGE DATE:  2/9/2023  DISCHARGE DIAGNOSIS:   Patient Active Problem List   Diagnosis Code    Acute ischemic stroke ([facility] I63.9    ICAO (internal carotid artery occlusion), right I65.21    Left-sided weakness R53.1       94 Ortega Lay is a 75 y.o. yo male who presented to [facility] on 2/4/2023 12:57 PM with L sided weakness. 69-year-old male with with past medical history of hypertension (on amlodipine) presented with complaint of left-sided numbness and weakness. As per patient, at around 11 AM he started feeling numb in the left upper extremity (going from shoulder down to the hand) along with weakness and having some loss of dexterity experienced while using his hand. He denies having any weakness in the left lower extremity but later on in the ED felt that left leg does not feel right. We called his sister and was brought to ER at [facility] with concern of stroke. On arrival to [facility] as BP was 138/68, . Patient had taken his morning amlodipine (unclear dosage) for hypertension. He denied using any antiplatelet/anticoagulation or statin. No history of smoking and drinks beer 2 times a week. On initial evaluation by stroke team, patient initially had NIH of 2 (left arm weakness and numbness). CT head without contrast: No acute abnormality. Remote left occipital infarct. CTA head and neck with contrast: Subtle linear hypodense focus in the proximal aspect of right ICA concerning for dissection versus right carotid terminus thrombus. Later on progressed to [place] of 8 (facial palsy, left arm, left leg, limb ataxia and sensory and dysarthria). Patient was given IV fluid 2 L and had improvement in exam again and NIH improved to 3.   Due to pressure related changes favoring improved stenosis decided not to be obtained. And patient may benefit with stents with close ICU monitoring. An emergent thrombectomy for now. Started on heparin, midodrine, aspirin, Brilinta and statin     On my eval, patient was awake alert and oriented x3 without any dysarthria or aphasia. No facial asymmetry noticed. No visual deficit noticed. Patient had numbness on the left side of the face left arm and left leg with difficulty appreciating sharp versus dull. Left upper extremity drift noticed. Left lower extremity 4/5 but no drift noticed. Patient and his sister was counseled about the management plan admission to the hospital in the ICU for close monitoring and further work-up. Art line placed. Received midodrine, 1L NS due to low SBP. Improved left sided symptoms, especially numbness. NIH 2 (numbness, drift in LUE). Consent obtained by neuro endovascular team for ICA stenting. Weaned off dopamine, symptoms improving, taken for DSA yesterday which showed supraclinoid stenosis w clot, plan for stent. off dopamine, pressures maintaining with unchanged exam. Started on midodrine. Heparin gtt discontinued. Patient will be taken for stenting outpatient by neuro endo, echo and holter monitor also ordered for outpatient. Patient advised to follow up with PCP. Labs and imaging were followed daily. At time of discharge, Mary Jacobson was tolerating a ADULT DIET; Regular, having bowel movements, and is in good condition to be discharged to home. PROCEDURES:    Arterial line   DSA    PHYSICAL EXAMINATION        Discharge Vitals:  height is 5' 7\" (1.702 m) and weight is 170 lb (77.1 kg). His oral temperature is 98.7 °F (37.1 °C). His blood pressure is 157/77 (abnormal) and his pulse is 60. His respiration is 25 and oxygen saturation is 98%. CONSTITUTIONAL:  Well developed, well nourished, alert and oriented x 3, in no acute distress. GCS 15. Nontoxic. No dysarthria. No aphasia.    HEAD: normocephalic, atraumatic    EYES:  PERRLA, EOMI.   ENT:  moist mucous membranes   NECK:  supple, symmetric   LUNGS:  Equal air entry bilaterally   CARDIOVASCULAR:  normal s1 / s2, RRR, distal pulses intact   ABDOMEN:  Soft, no rigidity   NEUROLOGIC:  Mental Status:  A & O x3,awake             Cranial Nerves:    cranial nerves II-XII are grossly intact     Motor Exam:    Drift:  present - mild left upper extremity  Tone:  normal     Motor exam is 5 out of 5 all extremities with the exception of left upper extremity 4+/5 and left lower extremity 4+/5     Sensory:    Touch:    Right Upper Extremity:  normal  Left Upper Extremity:  abnormal - slightly decreased sensation compared to right, but improved from previous   Right Lower Extremity:  normal  Left Lower Extremity:  normal     Plantar Response:  Right:  downgoing  Left:  downgoing     Clonus:  absent  Traore's:  absent     Coordination/Dysmetria:  Heel to Shin:  Right:  normal  Left: Normal  Finger to Nose:   Right:  normal  Left: Due to weakness  Dysdiadochokinesia: Not tested     Gait: Not tested       LABS/IMAGING     Recent Labs     02/07/23  0533 02/08/23  0459 02/08/23  1026 02/09/23  0259   WBC 8.0 6.4  --  8.7   HGB 11.9* 10.4*  --  11.2*   HCT 35.3* 31.4*  --  32.3*    175  --  196    140 137 138   K 4.1 4.0 4.5 3.5*   * 111* 110* 107   CO2 17* 19* 16* 19*   BUN 15 21 18 12   CREATININE 1.01 1.24* 1.07 1.05       CT HEAD WO CONTRAST    Result Date: 2/4/2023  EXAMINATION: CT OF THE HEAD WITHOUT CONTRAST  2/4/2023 1:00 pm TECHNIQUE: CT of the head was performed without the administration of intravenous contrast. Automated exposure control, iterative reconstruction, and/or weight based adjustment of the mA/kV was utilized to reduce the radiation dose to as low as reasonably achievable. COMPARISON: None.  HISTORY: ORDERING SYSTEM PROVIDED HISTORY: stroke alert, left sided TECHNOLOGIST PROVIDED HISTORY: stroke alert, left sided Decision Support Exception - unselect if not a suspected or confirmed emergency medical condition->Emergency Medical Condition (MA) FINDINGS: BRAIN/VENTRICLES: There is no acute intracranial hemorrhage, mass effect, or midline shift. There is a remote left occipital lobe infarct. The ventricles are symmetric. The infratentorial structures are unremarkable. ORBITS: The visualized portion of the orbits demonstrate no acute abnormality. SINUSES: The visualized paranasal sinuses and mastoid air cells demonstrate no acute abnormality. SOFT TISSUES/SKULL:  No acute abnormality of the visualized skull or soft tissues. No acute intracranial abnormality. Remote left occipital lobe infarct. Findings were discussed with Dr. Chu Lazar At 1:28 pm on 2/4/2023. IR ANGIOGRAM CAROTID C EREBRAL BILATERAL    Result Date: 2/6/2023  Date of Service: 6th Feb 2023 Procedure: Diagnostic cerebral angiogram Patient arrived to the angio suite at: 3:15pm Puncture obtained at: N/A, used existing A-line Vascular access was removed at: 4:15pm Manual pressure for minutes: N/A Patient wheeled out of the angio suite at: 4:30pm Diagnosis: R ICA supraclinoid stenosis and acute ischemic stroke Procedures: 1. Intravenous moderate sedation for 75 minutes 2. Selective right common carotid artery (CCA) angiogram 3. Selective right internal carotid artery (ICA) cerebral angiogram 4. Right common femoral artery (CFA) angiogram Neurointerventionalist: Carlyn Carroll MD, MS [place]: Mamat De La Rosa MD Contrast: 28 cc of Visipaque-320. Fluoroscopy time: 7.8 minutes Access: Right radial artery through an existing arterial line that was exchanged to a 5 Fr short sheath.  Comparison: CTA head neck done on the 2/4/23 and 2/6/23 Consent: After explaining the risks and benefits to the patient and the patient's family, including but not limited to stroke, coma, death, vessel injury, dissection, tear, occlusion, and X-ray dye allergic type reaction, a signed consent form was obtained. Indication and Clinical History: 75year old man with HTN, p/w acute onset of left hemiparesis, and found to have R ICA occlusion with right posterior frontal-parietal lobe acute stroke. Patient's symptoms significantly improved over last 2 days. Repeat CTA head and neck on 02/06 showed complete recanalization of the right intracranial ICA occlusion, with no evidence of large vessel occlusion? Anesthesia: Anesthesia/sedation initiated at: 3:15pm Anesthesia/sedation completed at: 4:30pm Total anesthesia/sedation: 75 mins. Local anesthesia with lidocaine. IV moderate sedation with Versed and Fentanyl IV moderate sedation was supervised by the attending physician. The patient was independently monitored by a registered nurse assigned to the Department of Radiology using automated blood pressure, EKG and pulse oximetry. The detailed Conscious Record is  permanently stored in the [address] Description and findings: The patient's right groin was prepped and draped in standard sterile fashion and under local anesthesia with conscious sedation. Patient already has an existing right radial arterial line that was used for measuring blood pressure. With direct vision, a wire was inserted into the arterial line, once the wire is secured, the arterial line was removed and a 5Fr short sheath was inserted over the wire. A 5 Czech Sim-2 catheter was then advanced over a guide wire to the level of the aortic arch. Right CCA technique: The right common carotid artery was selectively catheterized under fluoroscopic guidance and digital subtraction angiography images were obtained in biplane projections of the right cervical carotid artery. Interpretation: The right common carotid artery injection demonstrates normal antegrade flow into the external and internal carotid arteries with normal filling of the external carotid artery branches.  Course and caliber of the cervical portion of the right internal carotid artery are unremarkable. Further inspection demonstrates no evidence of dissection, stenosis, aneurysm, or other vascular abnormality within the right CCA into the cervical segment of the right ICA. Right ICA technique: The right internal carotid artery was then selectively catheterized, and digital subtraction angiography of the intracranial right internal carotid circulation was performed in frontal, and lateral projections. Interpretation: There is normal antegrade filling of the distal internal carotid artery, ophthalmic artery, anterior cerebral artery, middle cerebral artery and the distal branches. The right ICA supraclinoid segment is noted to be stenosed just distal to the origin of the right ophthalmic artery with super-imposed thrombosis. Stenosis measuring about 78% by WASID criteria. ? Inspection of the remaining right internal carotid circulation revealed no other evidence of cerebral aneurysm, arteriovenous malformation, or arterial stenosis. Capillary and venous phase images were also unremarkable, with no evidence of veno-occlusive disease. Right radial artery technique: A right radial artery angiogram was performed and demonstrated arterial catheterization proximal to the bifurcation. There was no evidence of dissection or occlusion within the right radial artery. TR band with 12cc of air was used to establish hemostasis at the right radial artery access site. No immediate complications were experienced. Patient was re-examined after the procedure with no change noted in their neurologic examination, with digits looking pink. The patient was subsequently discharged from the neurointerventional suite to the neuro ICU. Impression: 1. Right ICA supraclinoid segment stenosis just distal to the origin of the right ophthalmic artery with super-imposed thrombosis. Stenosis measuring about 78% by WASID criteria 2.   A 5Fr Sim 2 was used to get access into the right CCA Dr. Reed Hope dictated this invasive procedure. Dr. Carlyn Carroll was present for all procedural and imaging components of this case. Examination was reviewed and reported findings confirmed and evaluated by Dr. Carlyn Carroll. XR CHEST PORTABLE    Result Date: 2/4/2023  EXAMINATION: ONE XRAY VIEW OF THE CHEST 2/4/2023 1:24 pm COMPARISON: None. HISTORY: ORDERING SYSTEM PROVIDED HISTORY: stroke alert, dizziness TECHNOLOGIST PROVIDED HISTORY: stroke alert, dizziness FINDINGS: There is no acute consolidation or effusion. There is no pneumothorax. The mediastinal structures are unremarkable. The upper abdomen is unremarkable. The extrathoracic soft tissues are unremarkable. There is no acute osseous abnormality. No acute cardiopulmonary process. CTA HEAD NECK W CONTRAST    Result Date: 2/6/2023  EXAMINATION: CTA OF THE HEAD AND NECK WITH CONTRAST 2/6/2023 5:05 am: TECHNIQUE: CTA of the head and neck was performed with the administration of intravenous contrast. Multiplanar reformatted images are provided for review. MIP images are provided for review. Stenosis of the internal carotid arteries measured using NASCET criteria. Automated exposure control, iterative reconstruction, and/or weight based adjustment of the mA/kV was utilized to reduce the radiation dose to as low as reasonably achievable. COMPARISON: CTA of neck and head on 02/04/2023. MRI of brain on 02/05/2023. HISTORY: ORDERING SYSTEM PROVIDED HISTORY: R ICA occlusion, need to r/o worsening compare to the one on 2/4/23 TECHNOLOGIST PROVIDED HISTORY: R ICA occlusion, need to r/o worsening compare to the one on 2/4/23 FINDINGS: CTA NECK: AORTIC ARCH/ARCH VESSELS: No dissection or arterial injury. No significant stenosis of the brachiocephalic or subclavian arteries. CAROTID ARTERIES: No evidence of stenosis or dissection or compromise in the left common carotid artery, left internal carotid artery and left external carotid artery in the neck.  On the previous CTA on 02/04/2023, there was evidence of severe stenosis of the upper portion of the cervical segment of right ICA and just proximal to the petrous segment there was complete occlusion of the right ICA. There are also occlusion of the right ICA through the petrous segment, the cavernous segment and paraclinoid segment. There was reconstitution of the upper and of the supraclinoid segment of right ICA and the right middle cerebral artery via collateral circulation from the left ICA through anterior cerebral-anterior communicating artery connection. On the current CT study the mid and upper portion of the cervical segment of the right ICA and appears to be patent without significant stenosis. The petrous segment of right ICA is also patent and opacified. The precavernous segment and cavernous segment as well as the paraclinoid segment of right ICA are patent and opacified. However, there is still moderate to severe stenosis in the upper portion of the cavernous segment. VERTEBRAL ARTERIES: No dissection, arterial injury, or significant stenosis. SOFT TISSUES: The lung apices are clear. No cervical or superior mediastinal lymphadenopathy. The larynx and pharynx are unremarkable. No acute abnormality of the salivary and thyroid glands. BONES: No acute osseous abnormality. CTA HEAD: ANTERIOR CIRCULATION: In the upper cervical segment of right internal carotid artery, the petrous segment, precavernous segment and proximal cavernous segment, there is no obvious stenosis at this time. But there is evidence of moderate to severe stenosis involving the upper portion of the cavernous segment and the paraclinoid segment of right internal carotid artery. The supraclinoid segment of right internal carotid artery is patent without obvious stenosis. The right middle cerebral artery and branches are normally patent.   In the area of the right parietal cortical infarction the branches of right middle cerebral artery are identifiable. The anterior cerebral arteries of both sides are normally patent. The left middle cerebral artery and branches are normally patent. POSTERIOR CIRCULATION: No significant stenosis of the vertebral, basilar, or posterior cerebral arteries. In the area of old infarction of left occipital lobe, the branches of left posterior cerebral artery are not visualized. No aneurysm. OTHER: No dural venous sinus thrombosis on this non-dedicated study. BRAIN: No mass effect or midline shift. No extra-axial fluid collection. Evidence of focal low attenuation involving cerebral sulci in the lateral portion of the right parietal lobe corresponding to the areas of acute cortical infarction as visualized on previous MRI study on 02/05/2023. Redemonstration of old infarction with encephalomalacia involving the left occipital lobe. Since last CTA study on 02/04/2023, the occlusion of the upper portion of the cervical segment, petrous segment, precavernous segment and proximal cavernous segment of the right ICA appear to be resolved without significant stenosis, but still there appears to be moderate to severe stenosis involving the upper portion of the cavernous segment of right ICA and the paraclinoid segment of right ICA. The supraclinoid segment of right ICA is patent without significant stenosis at this time. In the neck, rest of common carotid and internal carotid arteries are patent without stenosis. Bilateral vertebral arteries are patent without stenosis or dissection. The right middle cerebral artery and its major branches are patent and opacified. Bilateral anterior cerebral arteries and the left middle cerebral artery and branches are normally patent. No evidence of compromise in the vertebrobasilar arterial circulation except for the area of old infarction involving the left occipital lobe.  Evidence of low attenuation, corresponding to acute cortical infarction in the lateral portion of right parietal lobe.  But multiple small branches of the right MCA are identifiable in the areas of cortical infarction. Redemonstration of prominent large old infarction and encephalomalacia involving the left occipital lobe. CTA HEAD NECK W CONTRAST    Result Date: 2/4/2023  EXAMINATION: CTA OF THE HEAD AND NECK WITH CONTRAST 2/4/2023 1:00 pm: TECHNIQUE: CTA of the head and neck was performed with the administration of intravenous contrast. Multiplanar reformatted images are provided for review. MIP images are provided for review. Stenosis of the internal carotid arteries measured using NASCET criteria. Automated exposure control, iterative reconstruction, and/or weight based adjustment of the mA/kV was utilized to reduce the radiation dose to as low as reasonably achievable. COMPARISON: None. HISTORY: ORDERING SYSTEM PROVIDED HISTORY: stroke alert left sided TECHNOLOGIST PROVIDED HISTORY: stroke alert left sided Decision Support Exception - unselect if not a suspected or confirmed emergency medical condition->Emergency Medical Condition (MA) FINDINGS: CTA NECK: AORTIC ARCH/ARCH VESSELS: No dissection or arterial injury. No significant stenosis of the brachiocephalic or subclavian arteries. CAROTID ARTERIES: The common carotid arteries are patent. The cervical portion of the left internal carotid artery is patent. There is a subtle linear hypodense focus in the proximal aspect of the right internal carotid artery with subsequent tapering of flow to minimal to no flow within the mid and distal right cervical internal carotid artery VERTEBRAL ARTERIES: No dissection, arterial injury, or significant stenosis. SOFT TISSUES: The lung apices are clear. No cervical or superior mediastinal lymphadenopathy. The larynx and pharynx are unremarkable. No acute abnormality of the salivary and thyroid glands. BONES: No acute osseous abnormality.  CTA HEAD: ANTERIOR CIRCULATION: No significant flow is seen in the right internal carotid artery. The left internal carotid artery is patent. The anterior cerebral arteries and middle cerebral arteries are patent. POSTERIOR CIRCULATION: No significant stenosis of the vertebral, basilar, or posterior cerebral arteries. No aneurysm. OTHER: No dural venous sinus thrombosis on this non-dedicated study. BRAIN: No mass effect or midline shift. No extra-axial fluid collection. The gray-white differentiation is maintained. Subtle linear hypodense focus in the proximal aspect of the right internal carotid artery with subsequent tapering of flow, from minimal to no flow, in the mid and distal right cervical internal carotid artery concerning for a dissection. Alternatively this may be the result of a right carotid terminus thrombus. Angiography may be helpful in further characterization. No significant flow within the intracranial portion of the right internal carotid artery. No significant stenosis or occlusion within the remainder of the intracranial vessels. Findings were discussed with Dr. Claude Brooking At 1:37 pm on 2/4/2023. MRI brain without contrast    Result Date: 2/5/2023  EXAMINATION: MRI OF THE BRAIN WITHOUT CONTRAST  2/5/2023 12:23 pm TECHNIQUE: Multiplanar multisequence MRI of the brain was performed without the administration of intravenous contrast. COMPARISON: CT brain performed 02/04/2023. HISTORY: ORDERING SYSTEM PROVIDED HISTORY: acute ischemic stroke TECHNOLOGIST PROVIDED HISTORY: acute ischemic stroke What is the sedation requirement?->None Reason for Exam: acute ischemic stroke FINDINGS: INTRACRANIAL STRUCTURES/VENTRICLES: The sellar and suprasellar structures, optic chiasm, corpus callosum, pineal gland, tectum, and midline brainstem structures are unremarkable. The craniocervical junction is unremarkable. There is no acute hemorrhage, mass effect, or midline shift. There is satisfactory overall gray-white matter differentiation. There is chronic microvascular disease.   There is remote left occipital lobe infarct. The ventricular structures are symmetric and unremarkable. The infratentorial structures including the cerebellopontine angles and internal auditory canals are unremarkable. There is restricted diffusion and associated FLAIR signal abnormality in the right parietal lobe. There is no abnormal blooming artifact on susceptibility weighted imaging. ORBITS: The visualized portion of the orbits demonstrate no acute abnormality. SINUSES: The visualized paranasal sinuses and mastoid air cells demonstrate no acute abnormality. BONES/SOFT TISSUES: The bone marrow signal intensity appears normal. The soft tissues demonstrate no acute abnormality. Subacute ischemia involving the right parietal lobe. Underlying chronic microvascular disease with remote left occipital lobe infarct. DISCHARGE INSTRUCTIONS     Discharge Medications:        Medication List        START taking these medications      aspirin 81 MG chewable tablet  Take 1 tablet by mouth daily  Start taking on: February 10, 2023     atorvastatin 80 MG tablet  Commonly known as: LIPITOR  Take 1 tablet by mouth nightly     ferrous sulfate 325 (65 Fe) MG EC tablet  Commonly known as: FE TABS 325  Take 1 tablet by mouth daily (with breakfast)  Start taking on: February 10, 2023     ticagrelor 90 MG Tabs tablet  Commonly known as: BRILINTA  Take 1 tablet by mouth 2 times daily               Where to Get Your Medications        These medications were sent to [facility] [address] [facility], [address] [facility]  [address], [address] [place] [address]      Phone: [phone]   aspirin 81 MG chewable tablet  atorvastatin 80 MG tablet  ferrous sulfate 325 (65 Fe) MG EC tablet  ticagrelor 90 MG Tabs tablet       Diet: ADULT DIET; Regular diet as tolerated  Activity: As tolerated  Follow-up:  2/15 outpatient stenting, PCP follow up in 2 weeks.    Time Spent for discharge: 30 minutes    Unique Anguiano Petty Rees MD  Neuro Critical Care  2/9/2023, 11:12 AM

## 2023-02-09 NOTE — PLAN OF CARE
Problem: Discharge Planning  Goal: Discharge to home or other facility with appropriate resources  Outcome: Progressing     Problem: Skin/Tissue Integrity  Goal: Absence of new skin breakdown  Description: 1. Monitor for areas of redness and/or skin breakdown  2. Assess vascular access sites hourly  3. Every 4-6 hours minimum:  Change oxygen saturation probe site  4. Every 4-6 hours:  If on nasal continuous positive airway pressure, respiratory therapy assess nares and determine need for appliance change or resting period.   Outcome: Progressing     Problem: ABCDS Injury Assessment  Goal: Absence of physical injury  Outcome: Progressing     Problem: Safety - Adult  Goal: Free from fall injury  Outcome: Progressing     Problem: Chronic Conditions and Co-morbidities  Goal: Patient's chronic conditions and co-morbidity symptoms are monitored and maintained or improved  Outcome: Progressing     Problem: Pain  Goal: Verbalizes/displays adequate comfort level or baseline comfort level  Outcome: Progressing  Flowsheets (Taken 2/8/2023 1600 by Chayo Carvalho RN)  Verbalizes/displays adequate comfort level or baseline comfort level:   Encourage patient to monitor pain and request assistance   Assess pain using appropriate pain scale

## 2023-02-09 NOTE — PROGRESS NOTES
Endovascular Neurosurgery Progress Note      Reason for evaluation: R ICA occlusion     SUBJECTIVE:     No acute events overnight, patient denied any headache, weakness or numbness     Review of Systems:  CONSTITUTIONAL:  negative for fevers, chills, fatigue and malaise    EYES:  negative for double vision, blurred vision and photophobia     HEENT:  negative for tinnitus, epistaxis and sore throat    RESPIRATORY:  negative for cough, shortness of breath, wheezing    CARDIOVASCULAR:  negative for chest pain, palpitations, syncope, edema    GASTROINTESTINAL:  negative for nausea, vomiting    GENITOURINARY:  negative for incontinence    MUSCULOSKELETAL:  negative for neck or back pain    NEUROLOGICAL:  Negative for weakness and tingling  negative for headaches and dizziness    PSYCHIATRIC:  negative for anxiety      Review of systems otherwise negative. OBJECTIVE:     Vitals:    02/09/23 0800   BP: (!) 131/102   Pulse: 75   Resp: 16   Temp: 98.7 °F (37.1 °C)   SpO2: 96%        General:  Gen: normal habitus, NAD  HEENT: NCAT, mucosa moist  Cvs: RRR, S1 S2 normal  Resp: symmetric unlabored breathing  Abd: s/nd/nt  Ext: no edema  Skin: no lesions seen, warm and dry    Neuro:  Gen: awake and alert, oriented x4. Lang/speech: no aphasia and dysarthria. Follows commands. CN: PERRL, EOMI, VFF, V1-3 intact, left face droop, hearing intact, shoulder shrug symmetric, tongue midline  Motor: grossly 5/5 UE and LE no drift  Sense:normal on the right, no sensation on the left  Coord: normal FTN and HTS  DTR: deferred  Gait: deferred    NIH Stroke Scale:   1a  Level of consciousness: 0 - alert; keenly responsive   1b. LOC questions:  0 - answers both questions correctly   1c. LOC commands: 0 - performs both tasks correctly   2. Best Gaze: 0 - normal   3. Visual: 0 - no visual loss   4. Facial Palsy: 0 - normal symmetric movement   5a. Motor left arm: 0   5b.   Motor right arm: 0 - no drift, limb holds 90 (or 45) degrees for full 10 seconds   6a. Motor left le - no drift; leg holds 30 degree position for full 5 seconds   6b  Motor right le - no drift; leg holds 30 degree position for full 5 seconds   7. Limb Ataxia: 0 - absent   8. Sensory: 1 - mild to moderate sensory loss; patient feels pinprick is less sharp or is dull on the affected side; there is a loss of superficial pain with pinprick but patient is aware of being touched    9. Best Language:  0 - no aphasia, normal   10. Dysarthria: 0 - normal   11. Extinction and Inattention: 1 - visual, tactile, auditory, spatial or personal inattention or extinction to bilateral simultaneous stimulation in one of the sensory modalities          Total:   2     MRS: 2      LABS:   Reviewed. Lab Results   Component Value Date    HGB 11.2 (L) 2023    WBC 8.7 2023     2023     2023    BUN 12 2023    CREATININE 1.05 2023    MG 1.9 2023    APTT 62.3 (H) 2023    INR 1.0 2023      No results found for: COVID19    RADIOLOGY:   Images were personally reviewed including:    CTA head and neck 23:       CTA head neck 23            MRI brain w/o con on 23: right posterior frontal parietal lobe acute stroke      ASSESSMENT:     75year old man with HTN, p/w acute onset of left hemiparesis, and found to have R ICA occlusion with right posterior frontal-parietal lobe acute stroke. Patient's symptoms significantly improved over last 2 days. Repeat CTA head and neck on  showed complete recanalization of the right intracranial ICA occlusion, with no evidence of large vessel occlusion   DSA completed on  which showed Right ICA supraclinoid segment stenosis with possible super-imposed thrombus  Considering recent CARSON, will recommend Right ICA endovascular stenting next week.    PLAN:     - con't aspirin 81mg daily  - con't brillinta 90mg BID  - con't lipitor 80mg daily  - -160 mm Hg  - PT/OT   - Scheduled for Endovascular right ICA stenting on 02/15 under GA as outpatient, family is aware and agrees with the plan      Case discussed with Dr. Plata Other attending.     Rishi Murray MD  Stroke, [facility]  [facility]  Electronically signed 2/9/2023 at 9:25 AM

## 2023-02-09 NOTE — PLAN OF CARE
Problem: Discharge Planning  Goal: Discharge to home or other facility with appropriate resources  2/9/2023 1341 by Maryann Epperson RN  Outcome: Adequate for Discharge  Flowsheets (Taken 2/9/2023 0800)  Discharge to home or other facility with appropriate resources:   Identify barriers to discharge with patient and caregiver   Arrange for needed discharge resources and transportation as appropriate  2/9/2023 0732 by Maryann Epperson RN  Outcome: Progressing  2/9/2023 0727 by Maryann Epperson RN  Outcome: Progressing  2/9/2023 0423 by Angel Hernandez RN  Outcome: Progressing     Problem: Skin/Tissue Integrity  Goal: Absence of new skin breakdown  Description: 1. Monitor for areas of redness and/or skin breakdown  2. Assess vascular access sites hourly  3. Every 4-6 hours minimum:  Change oxygen saturation probe site  4. Every 4-6 hours:  If on nasal continuous positive airway pressure, respiratory therapy assess nares and determine need for appliance change or resting period.   2/9/2023 1341 by Maryann Epperson RN  Outcome: Adequate for Discharge  2/9/2023 0732 by Maryann Epperson RN  Outcome: Progressing  2/9/2023 0727 by Maryann Epperson RN  Outcome: Progressing  2/9/2023 0423 by Angel Hernandez RN  Outcome: Progressing     Problem: ABCDS Injury Assessment  Goal: Absence of physical injury  2/9/2023 1341 by Maryann Epperson RN  Outcome: Adequate for Discharge  2/9/2023 0732 by Maryann Epperson RN  Outcome: Progressing  2/9/2023 0727 by Maryann Epperson RN  Outcome: Progressing  2/9/2023 0423 by Angel Hernandez RN  Outcome: Progressing

## 2023-02-09 NOTE — PROGRESS NOTES
CLINICAL PHARMACY NOTE: MEDS TO BEDS    Total # of Prescriptions Filled: 4   The following medications were delivered to the patient:  Brilinta 90mg  Iron 325mg  Atorvastatin 80mg  Aspirin 81mg Chew    Additional Documentation: delivered to patient in room 127 2/9 at 11:41am. Co-pay $22.07 clover.

## 2023-02-09 NOTE — PLAN OF CARE
Problem: Skin/Tissue Integrity  Goal: Absence of new skin breakdown  Description: 1. Monitor for areas of redness and/or skin breakdown  2. Assess vascular access sites hourly  3. Every 4-6 hours minimum:  Change oxygen saturation probe site  4. Every 4-6 hours:  If on nasal continuous positive airway pressure, respiratory therapy assess nares and determine need for appliance change or resting period.   2/9/2023 0732 by Socorro Olvera RN  Outcome: Progressing  2/9/2023 0727 by Socorro Olvera RN  Outcome: Progressing  2/9/2023 0423 by Hemanth Leon RN  Outcome: Progressing     Problem: ABCDS Injury Assessment  Goal: Absence of physical injury  2/9/2023 0732 by oScorro Olvera RN  Outcome: Progressing  2/9/2023 0727 by Socorro Olvera RN  Outcome: Progressing  2/9/2023 0423 by Hemanth Leon RN  Outcome: Progressing     Problem: Safety - Adult  Goal: Free from fall injury  2/9/2023 0732 by Socorro Olvera RN  Outcome: Progressing  2/9/2023 0727 by Socorro Olvera RN  Outcome: Progressing  2/9/2023 0423 by Hemanth Leon RN  Outcome: Progressing     Problem: Chronic Conditions and Co-morbidities  Goal: Patient's chronic conditions and co-morbidity symptoms are monitored and maintained or improved  2/9/2023 0732 by Socorro Olvera RN  Outcome: Progressing  2/9/2023 0727 by Socorro Olvera RN  Outcome: Progressing  2/9/2023 0423 by Hemanth Leon RN  Outcome: Progressing     Problem: Pain  Goal: Verbalizes/displays adequate comfort level or baseline comfort level  2/9/2023 0732 by Socorro Olvera RN  Outcome: Progressing  2/9/2023 0727 by Socorro Olvera RN  Outcome: Progressing  2/9/2023 0423 by Hemanth Leon RN  Outcome: Progressing  Flowsheets (Taken 2/8/2023 1600 by Socorro Olvera RN)  Verbalizes/displays adequate comfort level or baseline comfort level:   Encourage patient to monitor pain and request assistance   Assess pain using appropriate pain scale

## 2023-02-09 NOTE — PROGRESS NOTES
Endovascular Neurosurgery Progress Note      Reason for evaluation: R ICA occlusion     SUBJECTIVE:     No acute events overnight, patient denied any headache, weakness or numbness     Review of Systems:  CONSTITUTIONAL:  negative for fevers, chills, fatigue and malaise    EYES:  negative for double vision, blurred vision and photophobia     HEENT:  negative for tinnitus, epistaxis and sore throat    RESPIRATORY:  negative for cough, shortness of breath, wheezing    CARDIOVASCULAR:  negative for chest pain, palpitations, syncope, edema    GASTROINTESTINAL:  negative for nausea, vomiting    GENITOURINARY:  negative for incontinence    MUSCULOSKELETAL:  negative for neck or back pain    NEUROLOGICAL:  Negative for weakness and tingling  negative for headaches and dizziness    PSYCHIATRIC:  negative for anxiety      Review of systems otherwise negative. OBJECTIVE:     Vitals:    02/09/23 0900   BP:    Pulse: 55   Resp: 17   Temp:    SpO2: 97%        General:  Gen: normal habitus, NAD  HEENT: NCAT, mucosa moist  Cvs: RRR, S1 S2 normal  Resp: symmetric unlabored breathing  Abd: s/nd/nt  Ext: no edema  Skin: no lesions seen, warm and dry    Neuro:  Gen: awake and alert, oriented x4. Lang/speech: no aphasia and dysarthria. Follows commands. CN: PERRL, EOMI, VFF, V1-3 intact, left face droop, hearing intact, shoulder shrug symmetric, tongue midline  Motor: grossly 5/5 UE and LE no drift  Sense:normal on the right, no sensation on the left  Coord: normal FTN and HTS  DTR: deferred  Gait: deferred    NIH Stroke Scale:   1a  Level of consciousness: 0 - alert; keenly responsive   1b. LOC questions:  0 - answers both questions correctly   1c. LOC commands: 0 - performs both tasks correctly   2. Best Gaze: 0 - normal   3. Visual: 0 - no visual loss   4. Facial Palsy: 0 - normal symmetric movement   5a. Motor left arm: 0   5b. Motor right arm: 0 - no drift, limb holds 90 (or 45) degrees for full 10 seconds   6a. Motor left le - no drift; leg holds 30 degree position for full 5 seconds   6b  Motor right le - no drift; leg holds 30 degree position for full 5 seconds   7. Limb Ataxia: 0 - absent   8. Sensory: 1 - mild to moderate sensory loss; patient feels pinprick is less sharp or is dull on the affected side; there is a loss of superficial pain with pinprick but patient is aware of being touched    9. Best Language:  0 - no aphasia, normal   10. Dysarthria: 0 - normal   11. Extinction and Inattention: 1 - visual, tactile, auditory, spatial or personal inattention or extinction to bilateral simultaneous stimulation in one of the sensory modalities          Total:   2     MRS: 2      LABS:   Reviewed. Lab Results   Component Value Date    HGB 11.2 (L) 2023    WBC 8.7 2023     2023     2023    BUN 12 2023    CREATININE 1.05 2023    MG 1.9 2023    APTT 62.3 (H) 2023    INR 1.0 2023      No results found for: COVID19    RADIOLOGY:   Images were personally reviewed including:    CTA head and neck 23:       CTA head neck 23            MRI brain w/o con on 23: right posterior frontal parietal lobe acute stroke      ASSESSMENT:     75year old man with HTN, p/w acute onset of left hemiparesis, and found to have R ICA occlusion with right posterior frontal-parietal lobe acute stroke. Patient's symptoms significantly improved over last 2 days. Repeat CTA head and neck on  showed complete recanalization of the right intracranial ICA occlusion, with no evidence of large vessel occlusion   DSA completed on  which showed Right ICA supraclinoid segment stenosis with possible super-imposed thrombus  Considering recent CARSON, will recommend Right ICA endovascular stenting next week.    PLAN:     - con't aspirin 81mg daily  - con't brillinta 90mg BID  - con't lipitor 80mg daily  - -160 mm Hg  - PT/OT   - Scheduled for Endovascular right ICA stenting on 02/15 under GA as outpatient, family is aware and agrees with the plan      Case discussed with Dr. Nehemiah Bean attending. Patient to be followed by Dr. Lord Cao.      Sheila Arzola MD  Stroke, [facility]  [address] [place]  Electronically signed 2/9/2023 at 10:09 AM

## 2023-02-10 PROBLEM — I65.21 STENOSIS OF INTRACRANIAL PORTIONS OF RIGHT INTERNAL CAROTID ARTERY: Status: ACTIVE | Noted: 2023-02-10

## 2023-02-10 LAB
EKG ATRIAL RATE: 52 BPM
EKG ATRIAL RATE: 71 BPM
EKG P AXIS: 35 DEGREES
EKG P-R INTERVAL: 154 MS
EKG P-R INTERVAL: 80 MS
EKG Q-T INTERVAL: 432 MS
EKG Q-T INTERVAL: 436 MS
EKG QRS DURATION: 144 MS
EKG QRS DURATION: 96 MS
EKG QTC CALCULATION (BAZETT): 401 MS
EKG QTC CALCULATION (BAZETT): 473 MS
EKG R AXIS: -18 DEGREES
EKG R AXIS: -49 DEGREES
EKG T AXIS: 0 DEGREES
EKG T AXIS: 121 DEGREES
EKG VENTRICULAR RATE: 52 BPM
EKG VENTRICULAR RATE: 71 BPM

## 2023-02-10 PROCEDURE — 93010 ELECTROCARDIOGRAM REPORT: CPT | Performed by: INTERNAL MEDICINE

## 2023-02-14 ENCOUNTER — TELEPHONE (OUTPATIENT)
Dept: NEUROSURGERY | Age: 73
End: 2023-02-14

## 2023-02-14 RX ORDER — AMLODIPINE BESYLATE 10 MG/1
10 TABLET ORAL DAILY
COMMUNITY

## 2023-02-14 NOTE — TELEPHONE ENCOUNTER
Dr Quentin Riddle called and stated patient is taking ASA and Lakeshia Fire so patient is ok to proceed

## 2023-02-14 NOTE — PROGRESS NOTES
I talked to patient on given number. Patient stated that he has been taking ASA and Brilinta as prescribed. Patient is scheduled for intracranial stenting for tomorrow. Patient confirmed that he will be here at 9 am for scheduled procedure.

## 2023-02-15 ENCOUNTER — ANESTHESIA (OUTPATIENT)
Dept: INTERVENTIONAL RADIOLOGY/VASCULAR | Age: 73
End: 2023-02-15

## 2023-02-15 ENCOUNTER — HOSPITAL ENCOUNTER (OUTPATIENT)
Dept: INTERVENTIONAL RADIOLOGY/VASCULAR | Age: 73
Discharge: HOME | End: 2023-02-17
Payer: MEDICARE

## 2023-02-15 ENCOUNTER — ANESTHESIA EVENT (OUTPATIENT)
Dept: INTERVENTIONAL RADIOLOGY/VASCULAR | Age: 73
End: 2023-02-15

## 2023-02-15 VITALS
TEMPERATURE: 97.7 F | HEART RATE: 59 BPM | WEIGHT: 170 LBS | BODY MASS INDEX: 26.68 KG/M2 | HEIGHT: 67 IN | SYSTOLIC BLOOD PRESSURE: 118 MMHG | OXYGEN SATURATION: 95 % | RESPIRATION RATE: 11 BRPM | DIASTOLIC BLOOD PRESSURE: 70 MMHG

## 2023-02-15 DIAGNOSIS — I65.21 OCCLUSION OF RIGHT CAROTID ARTERY: ICD-10-CM

## 2023-02-15 LAB
ABO/RH: NORMAL
ACTIVATED CLOTTING TIME: 122 SEC (ref 79–149)
ACTIVATED CLOTTING TIME: 211 SEC (ref 79–149)
ANTIBODY SCREEN: NEGATIVE
ARM BAND NUMBER: NORMAL
COLLAGEN ADENOSINE-5'-DIPHOSPHATE (ADP) TIME: 166 SEC (ref 67–112)
COLLAGEN EPINEPHRINE TIME: 111 SEC (ref 85–172)
EGFR, POC: >60 ML/MIN/1.73M2
EXPIRATION DATE: NORMAL
GLUCOSE BLD-MCNC: 110 MG/DL (ref 74–100)
PLATELET FUNCTION INTERP: ABNORMAL
POC BUN: 15 MG/DL (ref 8–26)
POC CHLORIDE: 105 MMOL/L (ref 98–107)
POC CREATININE: 1.06 MG/DL (ref 0.51–1.19)
POC HEMATOCRIT: 40 % (ref 41–53)
POC HEMOGLOBIN: 13.8 G/DL (ref 13.5–17.5)
POC IONIZED CALCIUM: 0.82 MMOL/L (ref 1.15–1.33)
POC LACTIC ACID: 1.81 MMOL/L (ref 0.56–1.39)
POC POTASSIUM: 3.7 MMOL/L (ref 3.5–4.5)
POC SODIUM: 142 MMOL/L (ref 138–146)

## 2023-02-15 PROCEDURE — 85576 BLOOD PLATELET AGGREGATION: CPT

## 2023-02-15 PROCEDURE — 82330 ASSAY OF CALCIUM: CPT

## 2023-02-15 PROCEDURE — 2500000003 HC RX 250 WO HCPCS

## 2023-02-15 PROCEDURE — 6360000002 HC RX W HCPCS

## 2023-02-15 PROCEDURE — 76377 3D RENDER W/INTRP POSTPROCES: CPT

## 2023-02-15 PROCEDURE — 86850 RBC ANTIBODY SCREEN: CPT

## 2023-02-15 PROCEDURE — C1760 CLOSURE DEV, VASC: HCPCS

## 2023-02-15 PROCEDURE — 6360000004 HC RX CONTRAST MEDICATION

## 2023-02-15 PROCEDURE — 84520 ASSAY OF UREA NITROGEN: CPT

## 2023-02-15 PROCEDURE — C1894 INTRO/SHEATH, NON-LASER: HCPCS

## 2023-02-15 PROCEDURE — C1887 CATHETER, GUIDING: HCPCS

## 2023-02-15 PROCEDURE — 86901 BLOOD TYPING SEROLOGIC RH(D): CPT

## 2023-02-15 PROCEDURE — 2580000003 HC RX 258: Performed by: STUDENT IN AN ORGANIZED HEALTH CARE EDUCATION/TRAINING PROGRAM

## 2023-02-15 PROCEDURE — 2580000003 HC RX 258

## 2023-02-15 PROCEDURE — 82565 ASSAY OF CREATININE: CPT

## 2023-02-15 PROCEDURE — 36415 COLL VENOUS BLD VENIPUNCTURE: CPT

## 2023-02-15 PROCEDURE — 85347 COAGULATION TIME ACTIVATED: CPT

## 2023-02-15 PROCEDURE — 2709999900 HC NON-CHARGEABLE SUPPLY

## 2023-02-15 PROCEDURE — 82435 ASSAY OF BLOOD CHLORIDE: CPT

## 2023-02-15 PROCEDURE — 84295 ASSAY OF SERUM SODIUM: CPT

## 2023-02-15 PROCEDURE — C1769 GUIDE WIRE: HCPCS

## 2023-02-15 PROCEDURE — 36224 PLACE CATH CAROTD ART: CPT

## 2023-02-15 PROCEDURE — 84132 ASSAY OF SERUM POTASSIUM: CPT

## 2023-02-15 PROCEDURE — 82947 ASSAY GLUCOSE BLOOD QUANT: CPT

## 2023-02-15 PROCEDURE — 85014 HEMATOCRIT: CPT

## 2023-02-15 PROCEDURE — 83605 ASSAY OF LACTIC ACID: CPT

## 2023-02-15 PROCEDURE — 86900 BLOOD TYPING SEROLOGIC ABO: CPT

## 2023-02-15 RX ORDER — ONDANSETRON 2 MG/ML
4 INJECTION INTRAMUSCULAR; INTRAVENOUS
Status: ACTIVE | OUTPATIENT
Start: 2023-02-15 | End: 2023-02-16

## 2023-02-15 RX ORDER — SODIUM CHLORIDE, SODIUM LACTATE, POTASSIUM CHLORIDE, CALCIUM CHLORIDE 600; 310; 30; 20 MG/100ML; MG/100ML; MG/100ML; MG/100ML
INJECTION, SOLUTION INTRAVENOUS CONTINUOUS
Status: DISCONTINUED | OUTPATIENT
Start: 2023-02-15 | End: 2023-02-18 | Stop reason: HOSPADM

## 2023-02-15 RX ORDER — ROCURONIUM BROMIDE 10 MG/ML
INJECTION, SOLUTION INTRAVENOUS PRN
Status: DISCONTINUED | OUTPATIENT
Start: 2023-02-15 | End: 2023-02-15 | Stop reason: SDUPTHER

## 2023-02-15 RX ORDER — ONDANSETRON 2 MG/ML
4 INJECTION INTRAMUSCULAR; INTRAVENOUS EVERY 6 HOURS PRN
Status: DISCONTINUED | OUTPATIENT
Start: 2023-02-15 | End: 2023-02-18 | Stop reason: HOSPADM

## 2023-02-15 RX ORDER — HYDRALAZINE HYDROCHLORIDE 20 MG/ML
10 INJECTION INTRAMUSCULAR; INTRAVENOUS
Status: DISCONTINUED | OUTPATIENT
Start: 2023-02-15 | End: 2023-02-18 | Stop reason: HOSPADM

## 2023-02-15 RX ORDER — GLYCOPYRROLATE 0.2 MG/ML
INJECTION INTRAMUSCULAR; INTRAVENOUS PRN
Status: DISCONTINUED | OUTPATIENT
Start: 2023-02-15 | End: 2023-02-15 | Stop reason: SDUPTHER

## 2023-02-15 RX ORDER — ACETAMINOPHEN 325 MG/1
650 TABLET ORAL EVERY 4 HOURS PRN
Status: DISCONTINUED | OUTPATIENT
Start: 2023-02-15 | End: 2023-02-18 | Stop reason: HOSPADM

## 2023-02-15 RX ORDER — SODIUM CHLORIDE 0.9 % (FLUSH) 0.9 %
5-40 SYRINGE (ML) INJECTION EVERY 12 HOURS SCHEDULED
Status: DISCONTINUED | OUTPATIENT
Start: 2023-02-15 | End: 2023-02-18 | Stop reason: HOSPADM

## 2023-02-15 RX ORDER — SODIUM CHLORIDE 0.9 % (FLUSH) 0.9 %
5-40 SYRINGE (ML) INJECTION PRN
Status: DISCONTINUED | OUTPATIENT
Start: 2023-02-15 | End: 2023-02-18 | Stop reason: HOSPADM

## 2023-02-15 RX ORDER — NEOSTIGMINE METHYLSULFATE 5 MG/5 ML
SYRINGE (ML) INTRAVENOUS PRN
Status: DISCONTINUED | OUTPATIENT
Start: 2023-02-15 | End: 2023-02-15 | Stop reason: SDUPTHER

## 2023-02-15 RX ORDER — IODIXANOL 320 MG/ML
100 INJECTION, SOLUTION INTRAVASCULAR
Status: COMPLETED | OUTPATIENT
Start: 2023-02-15 | End: 2023-02-15

## 2023-02-15 RX ORDER — ONDANSETRON 2 MG/ML
INJECTION INTRAMUSCULAR; INTRAVENOUS PRN
Status: DISCONTINUED | OUTPATIENT
Start: 2023-02-15 | End: 2023-02-15 | Stop reason: SDUPTHER

## 2023-02-15 RX ORDER — PROTAMINE SULFATE 10 MG/ML
INJECTION, SOLUTION INTRAVENOUS PRN
Status: DISCONTINUED | OUTPATIENT
Start: 2023-02-15 | End: 2023-02-15 | Stop reason: SDUPTHER

## 2023-02-15 RX ORDER — SODIUM CHLORIDE 9 MG/ML
INJECTION, SOLUTION INTRAVENOUS PRN
Status: DISCONTINUED | OUTPATIENT
Start: 2023-02-15 | End: 2023-02-18 | Stop reason: HOSPADM

## 2023-02-15 RX ORDER — SODIUM CHLORIDE, SODIUM LACTATE, POTASSIUM CHLORIDE, CALCIUM CHLORIDE 600; 310; 30; 20 MG/100ML; MG/100ML; MG/100ML; MG/100ML
INJECTION, SOLUTION INTRAVENOUS CONTINUOUS PRN
Status: DISCONTINUED | OUTPATIENT
Start: 2023-02-15 | End: 2023-02-15 | Stop reason: SDUPTHER

## 2023-02-15 RX ORDER — DEXAMETHASONE SODIUM PHOSPHATE 10 MG/ML
INJECTION INTRAMUSCULAR; INTRAVENOUS PRN
Status: DISCONTINUED | OUTPATIENT
Start: 2023-02-15 | End: 2023-02-15 | Stop reason: SDUPTHER

## 2023-02-15 RX ORDER — HEPARIN SODIUM 1000 [USP'U]/ML
INJECTION, SOLUTION INTRAVENOUS; SUBCUTANEOUS PRN
Status: DISCONTINUED | OUTPATIENT
Start: 2023-02-15 | End: 2023-02-15 | Stop reason: SDUPTHER

## 2023-02-15 RX ORDER — PROPOFOL 10 MG/ML
INJECTION, EMULSION INTRAVENOUS PRN
Status: DISCONTINUED | OUTPATIENT
Start: 2023-02-15 | End: 2023-02-15 | Stop reason: SDUPTHER

## 2023-02-15 RX ORDER — FENTANYL CITRATE 50 UG/ML
INJECTION, SOLUTION INTRAMUSCULAR; INTRAVENOUS PRN
Status: DISCONTINUED | OUTPATIENT
Start: 2023-02-15 | End: 2023-02-15 | Stop reason: SDUPTHER

## 2023-02-15 RX ORDER — EPHEDRINE SULFATE/0.9% NACL/PF 50 MG/5 ML
SYRINGE (ML) INTRAVENOUS PRN
Status: DISCONTINUED | OUTPATIENT
Start: 2023-02-15 | End: 2023-02-15 | Stop reason: SDUPTHER

## 2023-02-15 RX ORDER — LIDOCAINE HYDROCHLORIDE 10 MG/ML
INJECTION, SOLUTION EPIDURAL; INFILTRATION; INTRACAUDAL; PERINEURAL PRN
Status: DISCONTINUED | OUTPATIENT
Start: 2023-02-15 | End: 2023-02-15 | Stop reason: SDUPTHER

## 2023-02-15 RX ORDER — PHENYLEPHRINE HCL IN 0.9% NACL 0.5 MG/5ML
SYRINGE (ML) INTRAVENOUS PRN
Status: DISCONTINUED | OUTPATIENT
Start: 2023-02-15 | End: 2023-02-15 | Stop reason: SDUPTHER

## 2023-02-15 RX ORDER — ONDANSETRON 4 MG/1
4 TABLET, ORALLY DISINTEGRATING ORAL EVERY 8 HOURS PRN
Status: DISCONTINUED | OUTPATIENT
Start: 2023-02-15 | End: 2023-02-18 | Stop reason: HOSPADM

## 2023-02-15 RX ADMIN — Medication 10 MG: at 12:30

## 2023-02-15 RX ADMIN — LIDOCAINE HYDROCHLORIDE 50 MG: 10 INJECTION, SOLUTION EPIDURAL; INFILTRATION; INTRACAUDAL; PERINEURAL at 11:27

## 2023-02-15 RX ADMIN — HYDROMORPHONE HYDROCHLORIDE 0.5 MG: 1 INJECTION, SOLUTION INTRAMUSCULAR; INTRAVENOUS; SUBCUTANEOUS at 13:59

## 2023-02-15 RX ADMIN — ONDANSETRON 4 MG: 2 INJECTION INTRAMUSCULAR; INTRAVENOUS at 12:45

## 2023-02-15 RX ADMIN — FENTANYL CITRATE 50 MCG: 50 INJECTION, SOLUTION INTRAMUSCULAR; INTRAVENOUS at 12:08

## 2023-02-15 RX ADMIN — Medication 100 MCG: at 12:26

## 2023-02-15 RX ADMIN — PROPOFOL 100 MG: 10 INJECTION, EMULSION INTRAVENOUS at 11:27

## 2023-02-15 RX ADMIN — GLYCOPYRROLATE 0.8 MG: 0.2 INJECTION INTRAMUSCULAR; INTRAVENOUS at 12:47

## 2023-02-15 RX ADMIN — IODIXANOL 32 ML: 320 INJECTION, SOLUTION INTRAVASCULAR at 12:50

## 2023-02-15 RX ADMIN — Medication 4 MG: at 12:47

## 2023-02-15 RX ADMIN — Medication 100 MCG: at 12:14

## 2023-02-15 RX ADMIN — HEPARIN SODIUM 5400 UNITS: 1000 INJECTION INTRAVENOUS; SUBCUTANEOUS at 12:09

## 2023-02-15 RX ADMIN — PHENYLEPHRINE HYDROCHLORIDE 50 MCG/MIN: 10 INJECTION INTRAVENOUS at 12:16

## 2023-02-15 RX ADMIN — Medication 100 MCG: at 12:12

## 2023-02-15 RX ADMIN — Medication 2 G: at 11:57

## 2023-02-15 RX ADMIN — SODIUM CHLORIDE, POTASSIUM CHLORIDE, SODIUM LACTATE AND CALCIUM CHLORIDE: 600; 310; 30; 20 INJECTION, SOLUTION INTRAVENOUS at 11:22

## 2023-02-15 RX ADMIN — Medication 100 MCG: at 12:10

## 2023-02-15 RX ADMIN — SODIUM CHLORIDE, POTASSIUM CHLORIDE, SODIUM LACTATE AND CALCIUM CHLORIDE: 600; 310; 30; 20 INJECTION, SOLUTION INTRAVENOUS at 10:20

## 2023-02-15 RX ADMIN — Medication 100 MCG: at 12:19

## 2023-02-15 RX ADMIN — Medication 5 MG: at 12:41

## 2023-02-15 RX ADMIN — SODIUM CHLORIDE, POTASSIUM CHLORIDE, SODIUM LACTATE AND CALCIUM CHLORIDE: 600; 310; 30; 20 INJECTION, SOLUTION INTRAVENOUS at 12:50

## 2023-02-15 RX ADMIN — PROTAMINE SULFATE 30 MG: 10 INJECTION, SOLUTION INTRAVENOUS at 12:41

## 2023-02-15 RX ADMIN — Medication 5 MG: at 12:45

## 2023-02-15 RX ADMIN — FENTANYL CITRATE 50 MCG: 50 INJECTION, SOLUTION INTRAMUSCULAR; INTRAVENOUS at 11:27

## 2023-02-15 RX ADMIN — Medication 0.5 MG: at 13:59

## 2023-02-15 RX ADMIN — ROCURONIUM BROMIDE 30 MG: 10 INJECTION, SOLUTION INTRAVENOUS at 11:27

## 2023-02-15 RX ADMIN — Medication 100 MCG: at 12:16

## 2023-02-15 RX ADMIN — PROPOFOL 50 MG: 10 INJECTION, EMULSION INTRAVENOUS at 12:08

## 2023-02-15 RX ADMIN — ROCURONIUM BROMIDE 20 MG: 10 INJECTION, SOLUTION INTRAVENOUS at 12:08

## 2023-02-15 RX ADMIN — DEXAMETHASONE SODIUM PHOSPHATE 10 MG: 10 INJECTION INTRAMUSCULAR; INTRAVENOUS at 12:00

## 2023-02-15 ASSESSMENT — PAIN DESCRIPTION - LOCATION
LOCATION: GROIN
LOCATION: PENIS
LOCATION: GROIN

## 2023-02-15 ASSESSMENT — PAIN DESCRIPTION - DESCRIPTORS
DESCRIPTORS: BURNING
DESCRIPTORS: ACHING;BURNING
DESCRIPTORS: BURNING
DESCRIPTORS: BURNING

## 2023-02-15 ASSESSMENT — PAIN - FUNCTIONAL ASSESSMENT: PAIN_FUNCTIONAL_ASSESSMENT: 0-10

## 2023-02-15 ASSESSMENT — PAIN SCALES - GENERAL
PAINLEVEL_OUTOF10: 4
PAINLEVEL_OUTOF10: 7
PAINLEVEL_OUTOF10: 7

## 2023-02-15 NOTE — PROGRESS NOTES
Dr. Ko Ambrose called recovery and verbally stated that there are no discharge instructions to let the pt know about other than no lifting greater than 20lbs for the next 2 days. Will update pt and family.

## 2023-02-15 NOTE — SEDATION DOCUMENTATION
Stenosis improved since DSA; no intervention at this time. Quinolones Pregnancy And Lactation Text: This medication is Pregnancy Category C and it isn't know if it is safe during pregnancy. It is also excreted in breast milk.

## 2023-02-15 NOTE — ANESTHESIA PRE PROCEDURE
Department of Anesthesiology  Preprocedure Note       Name:  Suzanne Castellanos   Age:  75 y.o.  :  1950                                          MRN:  [id number]         Date:  2/15/2023      Surgeon: * Surgery not found *    Procedure:     Medications prior to admission:   Prior to Admission medications    Medication Sig Start Date End Date Taking? Authorizing Provider   amLODIPine (NORVASC) 10 MG tablet Take 10 mg by mouth daily   Yes Historical Provider, MD   aspirin 81 MG chewable tablet Take 1 tablet by mouth daily 2/10/23   Dolores Cotto MD   atorvastatin (LIPITOR) 80 MG tablet Take 1 tablet by mouth nightly 23   Dolores Cotto MD   ferrous sulfate (FE TABS 325) 325 (65 Fe) MG EC tablet Take 1 tablet by mouth daily (with breakfast) 2/10/23   Dolores Cotto MD   ticagrelor (BRILINTA) 90 MG TABS tablet Take 1 tablet by mouth 2 times daily 23   Dolores Cotto MD       Current medications:    Current Outpatient Medications   Medication Sig Dispense Refill    amLODIPine (NORVASC) 10 MG tablet Take 10 mg by mouth daily      aspirin 81 MG chewable tablet Take 1 tablet by mouth daily 30 tablet 3    atorvastatin (LIPITOR) 80 MG tablet Take 1 tablet by mouth nightly 30 tablet 3    ferrous sulfate (FE TABS 325) 325 (65 Fe) MG EC tablet Take 1 tablet by mouth daily (with breakfast) 90 tablet 3    ticagrelor (BRILINTA) 90 MG TABS tablet Take 1 tablet by mouth 2 times daily 60 tablet 0     No current facility-administered medications for this encounter.        Allergies:  No Known Allergies    Problem List:    Patient Active Problem List   Diagnosis Code    Acute ischemic stroke ([address] I63.9    ICAO (internal carotid artery occlusion), right I65.21    Left-sided weakness R53.1    Stenosis of intracranial portions of right internal carotid artery I65.21       Past Medical History:        Diagnosis Date    Cerebral artery occlusion with cerebral infarction ([address]     acute ischemic    HTN (hypertension)     Hyperlipidemia     ICAO (internal carotid artery occlusion)     right    Personal history of other medical treatment     low blood count    Weakness     and numbness left arm    Wears glasses     Wellness examination     PCP Jose Duran MD/ last seen        Past Surgical History:        Procedure Laterality Date    COLONOSCOPY         Social History:    Social History     Tobacco Use    Smoking status: Never     Passive exposure: Never    Smokeless tobacco: Never   Substance Use Topics    Alcohol use: Yes     Comment: none X 1 month currently                                Counseling given: Not Answered      Vital Signs (Current):   Vitals:    02/14/23 1325   Weight: 170 lb (77.1 kg)   Height: 5' 7\" (1.702 m)                                              BP Readings from Last 3 Encounters:   02/09/23 (!) 140/85       NPO Status:                                                                                 BMI:   Wt Readings from Last 3 Encounters:   02/14/23 170 lb (77.1 kg)   02/04/23 170 lb (77.1 kg)     Body mass index is 26.63 kg/m². CBC:   Lab Results   Component Value Date/Time    WBC 8.7 02/09/2023 02:59 AM    RBC 3.53 02/09/2023 02:59 AM    HGB 11.2 02/09/2023 02:59 AM    HCT 32.3 02/09/2023 02:59 AM    MCV 91.5 02/09/2023 02:59 AM    RDW 11.7 02/09/2023 02:59 AM     02/09/2023 02:59 AM       CMP:   Lab Results   Component Value Date/Time     02/09/2023 02:59 AM    K 3.5 02/09/2023 02:59 AM     02/09/2023 02:59 AM    CO2 19 02/09/2023 02:59 AM    BUN 12 02/09/2023 02:59 AM    CREATININE 1.05 02/09/2023 02:59 AM    LABGLOM >60 02/09/2023 02:59 AM    GLUCOSE 93 02/09/2023 02:59 AM    CALCIUM 8.8 02/09/2023 02:59 AM       POC Tests: No results for input(s): POCGLU, POCNA, POCK, POCCL, POCBUN, POCHEMO, POCHCT in the last 72 hours.     Coags:   Lab Results   Component Value Date/Time    PROTIME 11.1 02/04/2023 01:22 PM    INR 1.0 02/04/2023 01:22 PM    APTT 62.3 02/08/2023 04:59 AM       HCG (If Applicable): No results found for: PREGTESTUR, PREGSERUM, HCG, HCGQUANT     ABGs: No results found for: PHART, PO2ART, JDH0FRJ, JIG5JIJ, BEART, S6UJXHYB     Type & Screen (If Applicable):  No results found for: LABABO, LABRH    Drug/Infectious Status (If Applicable):  No results found for: HIV, HEPCAB    COVID-19 Screening (If Applicable): No results found for: COVID19        Anesthesia Evaluation  Patient summary reviewed and Nursing notes reviewed no history of anesthetic complications:   Airway: Mallampati: II  TM distance: >3 FB   Neck ROM: full  Mouth opening: > = 3 FB   Dental: normal exam         Pulmonary:Negative Pulmonary ROS breath sounds clear to auscultation                             Cardiovascular:    (+) hypertension:, hyperlipidemia        Rhythm: regular  Rate: normal                    Neuro/Psych:   (+) CVA:,              ROS comment:  Right ICA supraclinoid segment stenosis just distal to the origin of the right ophthalmic artery with super-imposed thrombosis. Stenosis measuring about 78% by WASID criteria GI/Hepatic/Renal: Neg GI/Hepatic/Renal ROS            Endo/Other: Negative Endo/Other ROS                    Abdominal:             Vascular: negative vascular ROS. Other Findings:           Anesthesia Plan      general     ASA 3       Induction: intravenous. arterial line  MIPS: Postoperative opioids intended and Prophylactic antiemetics administered. Anesthetic plan and risks discussed with patient. Plan discussed with CRNA.                     Kalli Witt MD   2/15/2023

## 2023-02-15 NOTE — ANESTHESIA PROCEDURE NOTES
Arterial Line:    An arterial line was placed using ultrasound guidance, in the OR for the following indication(s): continuous blood pressure monitoring and blood sampling needed. A 20 gauge (size) (length), Arrow (type) catheter was placed, Seldinger technique used, into the right radial artery, secured by Tegaderm. Anesthesia type: General    Events:  greater than 3 attempts, patient tolerated procedure well with no complications and EBL < 5mL.   Anesthesiologist: Rnajan Lau MD  Preanesthetic Checklist  Completed: patient identified, IV checked, risks and benefits discussed, surgical/procedural consents, equipment checked, pre-op evaluation, timeout performed, anesthesia consent given, oxygen available, monitors applied/VS acknowledged, fire risk safety assessment completed and verbalized and blood product R/B/A discussed and consented

## 2023-02-15 NOTE — PROGRESS NOTES
Pt had questions about the surgery location- physician answered questions verifying that it was the right internal carotid behind the pt's eye. Mentioned that with the right wrist swollen, they may have to go in the right femoral artery for the procedure.      Pt's son at bedside

## 2023-02-15 NOTE — PROGRESS NOTES
Called IR to ask if it is okay to take safeguard after a remaining of 10cc is in place. Clarice Higuera it was okay and to make sure there is no active bleeding or drainage. I took the safeguard off of patient and noted no drainage or bleeding at the site. Instructed pt if there was any bleeding noted to apply pressure for about 15 mins. If there is an extensive amount of bleeding noted, advised pt to come back to ER. Pt verbalized understanding as well as the pts son.

## 2023-02-15 NOTE — BRIEF OP NOTE
Brief Postoperative Note                  [facility]    NEUROENDOVASCULAR SERVICE: POST-OP NOTE: 2/15/2023    Pt Name: Little Pate  MRN: [id number]  [place]: 1950  Date of Procedure: 2/15/2023  Primary Care Physician: Nya Phillips MD        Pre-Procedural Diagnosis: Right ICA supra-clinoid severe stenosis   Post-Procedural Diagnosis: significantly reduced right ICA stenosis       Procedure Performed:Diagnostic Cerebral Angiogram    Surgeon:   Jamshid Gonzalez MD    Fellow:  Abdirahman Chowdhury MD and Shahida Boss MD     Assisting Tech:  Nirav Rolon    PRE-PROCEDURAL EXAM:  Prestroke baseline mRS MODIFIED CHANTEL SCORE: 0 - No symptoms at all. Neurological exam performed and unchanged from initial H&P or consult      Anesthesia: General Anesthesia  Complications: none    Intra-Operative EXAM:  Neurological exam performed and unchanged from initial H&P or consult    EBL: < less than 50       Cc            Specimens: Were not Obtained  Contrast:     Visipaque 320 low osmolar 32 Cc             Fluoro: 9.6 min    Findings:  Please see dictated Radiology note for further details  Right ICA supra-clinoid segment just distal to ophthalmic artery origin stenosis measuring about 37% by WASID criteria   Significantly improved severity of stenosis compared to prior DSA  Aborted intracranial stenting since significantly improved stenosis         POST-PROCEDURAL EXAM :   Stable neurological Exam  Neurological exam performed and unchanged from initial H&P or consult    Closure:  right V[place] 6   F        POST-PROCEDURAL MONITORING : see orders  Disposition: Recovery room      Recommendations:  Back to Recovery room  Do not bend right leg for 3 hours. Groin checks per protocol. Peripheral pulse checks per protocol. SBP goal 100-140 mm Hg   C/w ASA and Brilinta   C/w Lipitor   Follow up with Shahida Boss MD  2 weeks after discharge and Dr. Casey Pena 3 months after discharge.         Abdirahman Chowdhury MD Amairani Henry MD   Pager: [phone]  Stroke, [facility]  [facility] [address]  Electronically signed 2/15/2023 at 12:51 PM

## 2023-02-15 NOTE — H&P
Endovascular Neurosurgery History and Physical      Reason for evaluation: Right ICA supra-clinoid stenosis     SUBJECTIVE:   History of Chief Complaint:    Mr. Jazzy Toro is a 76 y/o M with Htn, Right MCA stroke with underlying Right ICA supraclinoid severe stenosis with possible super-imposed thrombus, presented today for scheduled endovascular right ICA stenting and balloon angioplasty. Patient denied any new weakness or numbness since discharge from last week. Patient stated that he has been taking aspirin and Brilinta since discharge. Allergies  has No Known Allergies. Medications  Prior to Admission medications    Medication Sig Start Date End Date Taking? Authorizing Provider   amLODIPine (NORVASC) 10 MG tablet Take 10 mg by mouth daily   Yes Historical Provider, MD   aspirin 81 MG chewable tablet Take 1 tablet by mouth daily 2/10/23   Lorinda Shone, MD   atorvastatin (LIPITOR) 80 MG tablet Take 1 tablet by mouth nightly 2/9/23   Lorinda Shone, MD   ferrous sulfate (FE TABS 325) 325 (65 Fe) MG EC tablet Take 1 tablet by mouth daily (with breakfast) 2/10/23   Lorinda Shone, MD   ticagrelor (BRILINTA) 90 MG TABS tablet Take 1 tablet by mouth 2 times daily 2/9/23   Lorinda Shone, MD    Scheduled Meds:  Continuous Infusions:  PRN Meds:.  Past Medical History   has a past medical history of Cerebral artery occlusion with cerebral infarction ([address] HTN (hypertension), Hyperlipidemia, ICAO (internal carotid artery occlusion), Personal history of other medical treatment, Weakness, Wears glasses, and Wellness examination. Past Surgical History   has a past surgical history that includes Colonoscopy. Social History   reports that he has never smoked. He has never been exposed to tobacco smoke. He has never used smokeless tobacco.   reports current alcohol use. reports no history of drug use. Family History  family history includes Diabetes in his mother; Heart Attack in his father.     Review of Systems:  CONSTITUTIONAL:  negative for fevers, chills, fatigue and malaise    EYES:  negative for double vision, blurred vision and photophobia     HEENT:  negative for tinnitus, epistaxis and sore throat    RESPIRATORY:  negative for cough, shortness of breath, wheezing    CARDIOVASCULAR:  negative for chest pain, palpitations, syncope, edema    GASTROINTESTINAL:  negative for nausea, vomiting    GENITOURINARY:  negative for incontinence    MUSCULOSKELETAL:  negative for neck or back pain    NEUROLOGICAL:  Negative for weakness and tingling  negative for headaches and dizziness    PSYCHIATRIC:  negative for anxiety      Review of systems otherwise negative. OBJECTIVE:     Vitals:    02/15/23 1003   BP: (!) 152/89   Pulse: 78   Resp: 16   Temp: (!) 96.2 °F (35.7 °C)   SpO2: 99%        General:  Gen: normal habitus, NAD  HEENT: NCAT, mucosa moist  Cvs: RRR, S1 S2 normal  Resp: symmetric unlabored breathing  Abd: s/nd/nt  Ext: no edema  Skin: no lesions seen, warm and dry    Neuro:  Gen: awake and alert, oriented x3. Lang/speech: no aphasia or dysarthria. Follows commands. CN: PERRL, EOMI, VFF, V1-3 intact, face symmetric, hearing intact, shoulder shrug symmetric, tongue midline  Motor: grossly 5/5 UE and LE b/l  Sense: mild sensory loss on left UE  Coord: FTN and HTS intact b/l  DTR: deferred  Gait: narrow base gait    NIH Stroke Scale:   1a  Level of consciousness: 0 - alert; keenly responsive   1b. LOC questions:  0 - answers both questions correctly   1c. LOC commands: 0 - performs both tasks correctly   2. Best Gaze: 0 - normal   3. Visual: 0 - no visual loss   4. Facial Palsy: 0 - normal symmetric movement   5a. Motor left arm: 0 - no drift, limb holds 90 (or 45) degrees for full 10 seconds   5b. Motor right arm: 0 - no drift, limb holds 90 (or 45) degrees for full 10 seconds   6a.  Motor left le - no drift; leg holds 30 degree position for full 5 seconds   6b  Motor right le - no drift; leg holds 30 degree position for full 5 seconds   7. Limb Ataxia: 0 - absent   8. Sensory: 1 - mild to moderate sensory loss; patient feels pinprick is less sharp or is dull on the affected side; there is a loss of superficial pain with pinprick but patient is aware of being touched    9. Best Language:  0 - no aphasia, normal   10. Dysarthria: 0 - normal   11. Extinction and Inattention: 1 - visual, tactile, auditory, spatial or personal inattention or extinction to bilateral simultaneous stimulation in one of the sensory modalities          Total:   2     MRS: 02      LABS:   Reviewed. Lab Results   Component Value Date    HGB 11.2 (L) 02/09/2023    WBC 8.7 02/09/2023     02/09/2023     02/09/2023    BUN 12 02/09/2023    CREATININE 1.05 02/09/2023    MG 1.9 02/04/2023    APTT 62.3 (H) 02/08/2023    INR 1.0 02/04/2023      No results found for: COVID19    RADIOLOGY:   Images were personally reviewed including:  IR 02/06/23:   1. Right ICA supraclinoid segment stenosis just distal to the origin of the right ophthalmic artery with super-imposed thrombosis. Stenosis measuring about 78% by WASID criteria   2. A 5Fr Sim 2 was used to get access into the right CCA       ASSESSMENT:   76 y/o M with Htn, Right MCA stroke with underlying Right ICA supraclinoid severe stenosis with possible super-imposed thrombus, presented today for scheduled endovascular right ICA stenting and balloon angioplasty. Neuro exam was significant for residual left UE reduced sensation, NIHSS was 02   Risks and benefits discussed, risks include but are not limited to bruising, stroke, subarachnoid hemorrhage, death, retroperitoneal hematoma, pseudoaneurysm, lower extremity/renal/peripheral vascular compromise, informed consent obtained.     PLAN:   --Endovascular right ICA stenosis revascularization with stenting and balloon angioplasty   --Procedure with GA  --A line for BP monitoring   --ICU admission post-procedure     Case discussed with Dr. Mango Grace attending.     Lionel Ellsworth MD    Stroke, [facility]  [address] [place]  Electronically signed 2/15/2023 at 10:18 AM

## 2023-02-17 NOTE — ANESTHESIA POSTPROCEDURE EVALUATION
Department of Anesthesiology  Postprocedure Note    Patient: Melissa Rubi  MRN: [id number]  YOB: 1950  Date of evaluation: 2/17/2023      Procedure Summary     Date: 02/15/23 Room / Location: [address] [place] [address] 83 Procedures    Anesthesia Start: 1122 Anesthesia Stop: 1319    Procedure: IR ANGIOGRAM CAROTID CEREBRAL BILATERAL Diagnosis:       Occlusion of right carotid artery      Occlusion and stenosis of right carotid artery      (rt carotid stent)    Scheduled Providers: PATRICIA Mares - CRNA Responsible Provider: William Nolasco MD    Anesthesia Type: general ASA Status: 3          Anesthesia Type: No value filed.     Margarita Phase I: Margarita Score: 10    Margarita Phase II: Margarita Score: 10      Anesthesia Post Evaluation    Patient location during evaluation: bedside  Patient participation: complete - patient participated  Level of consciousness: awake  Airway patency: patent  Nausea & Vomiting: no nausea and no vomiting  Complications: no  Cardiovascular status: blood pressure returned to baseline  Respiratory status: acceptable  Hydration status: euvolemic  Comments: /70   Pulse 59   Temp 97.7 °F (36.5 °C) (Temporal)   Resp 11   Ht 5' 7\" (1.702 m)   Wt 170 lb (77.1 kg)   SpO2 95%   BMI 26.63 kg/m²

## 2023-02-24 ENCOUNTER — OFFICE VISIT (OUTPATIENT)
Dept: NEUROLOGY | Age: 73
End: 2023-02-24

## 2023-02-24 VITALS
WEIGHT: 170 LBS | RESPIRATION RATE: 20 BRPM | SYSTOLIC BLOOD PRESSURE: 143 MMHG | DIASTOLIC BLOOD PRESSURE: 81 MMHG | BODY MASS INDEX: 26.63 KG/M2 | HEART RATE: 97 BPM | OXYGEN SATURATION: 95 % | TEMPERATURE: 98 F

## 2023-02-24 DIAGNOSIS — I65.21 STENOSIS OF RIGHT INTERNAL CAROTID ARTERY: Primary | ICD-10-CM

## 2023-02-24 DIAGNOSIS — R53.1 LEFT-SIDED WEAKNESS: ICD-10-CM

## 2023-02-24 NOTE — PROGRESS NOTES
Endovascular Neurosurgery Clinic Note      Reason for evaluation: Right ICA supra-clinoid stenosis     SUBJECTIVE:   History of Chief Complaint:    Mr. Avila Jackson is a 76 y/o M with Htn, Right MCA stroke with underlying Right ICA supraclinoid severe stenosis with possible super-imposed thrombus, patient then underwent another diagnostic angiogram with intention to treat the stenosis, which showed significant improvement of the stenosis based on WASID criteria. Stenosis was measuring about 37%. Therefore intracranial stenting was aborted. Today patient presented for first hospital follow-up. Patient denied having any new weakness or numbness. Patient was complaining of right wrist pain and swelling with limited range of motion. Allergies  has No Known Allergies. Medications  Prior to Admission medications    Medication Sig Start Date End Date Taking? Authorizing Provider   amLODIPine (NORVASC) 10 MG tablet Take 10 mg by mouth daily   Yes Historical Provider, MD   aspirin 81 MG chewable tablet Take 1 tablet by mouth daily 2/10/23  Yes Wendy Borja MD   atorvastatin (LIPITOR) 80 MG tablet Take 1 tablet by mouth nightly 2/9/23  Yes Wendy Borja MD   ferrous sulfate (FE TABS 325) 325 (65 Fe) MG EC tablet Take 1 tablet by mouth daily (with breakfast) 2/10/23  Yes Wendy Borja MD   ticagrelor (BRILINTA) 90 MG TABS tablet Take 1 tablet by mouth 2 times daily 2/9/23  Yes Wendy Borja MD    Scheduled Meds:  Continuous Infusions:  PRN Meds:.  Past Medical History   has a past medical history of Cerebral artery occlusion with cerebral infarction ([address] HTN (hypertension), Hyperlipidemia, ICAO (internal carotid artery occlusion), Personal history of other medical treatment, Weakness, Wears glasses, and Wellness examination. Past Surgical History   has a past surgical history that includes Colonoscopy. Social History   reports that he has never smoked. He has never been exposed to tobacco smoke.  He has never used smokeless tobacco.   reports current alcohol use. reports no history of drug use. Family History  family history includes Diabetes in his mother; Heart Attack in his father. Review of Systems:  CONSTITUTIONAL:  negative for fevers, chills, fatigue and malaise    EYES:  negative for double vision, blurred vision and photophobia     HEENT:  negative for tinnitus, epistaxis and sore throat    RESPIRATORY:  negative for cough, shortness of breath, wheezing    CARDIOVASCULAR:  negative for chest pain, palpitations, syncope, edema    GASTROINTESTINAL:  negative for nausea, vomiting    GENITOURINARY:  negative for incontinence    MUSCULOSKELETAL:  negative for neck or back pain    NEUROLOGICAL:  Negative for weakness and tingling  negative for headaches and dizziness    PSYCHIATRIC:  negative for anxiety      Review of systems otherwise negative. OBJECTIVE:     Vitals:    02/24/23 1336   BP: (!) 143/81   Pulse:    Resp:    Temp:    SpO2:         General:  Gen: normal habitus, NAD  HEENT: NCAT, mucosa moist  Cvs: RRR, S1 S2 normal  Resp: symmetric unlabored breathing  Abd: s/nd/nt  Ext: no edema  Skin: no lesions seen, warm and dry    Neuro:  Gen: awake and alert, oriented x3. Lang/speech: no aphasia or dysarthria. Follows commands. CN: PERRL, EOMI, VFF, V1-3 intact, face symmetric, hearing intact, shoulder shrug symmetric, tongue midline  Motor: grossly 5/5 UE and LE b/l  Sense: mild sensory loss on left UE  Coord: FTN and HTS intact b/l  DTR: deferred  Gait: narrow base gait     NIH Stroke Scale:   1a  Level of consciousness: 0 - alert; keenly responsive   1b. LOC questions:  0 - answers both questions correctly   1c. LOC commands: 0 - performs both tasks correctly   2. Best Gaze: 0 - normal   3. Visual: 0 - no visual loss   4. Facial Palsy: 0 - normal symmetric movement   5a. Motor left arm: 0 - no drift, limb holds 90 (or 45) degrees for full 10 seconds   5b.   Motor right arm: 0 - no drift, limb holds 90 (or 45) degrees for full 10 seconds   6a. Motor left le - no drift; leg holds 30 degree position for full 5 seconds   6b  Motor right le - no drift; leg holds 30 degree position for full 5 seconds   7. Limb Ataxia: 0 - absent   8. Sensory: 1 - mild to moderate sensory loss; patient feels pinprick is less sharp or is dull on the affected side; there is a loss of superficial pain with pinprick but patient is aware of being touched    9. Best Language:  0 - no aphasia, normal   10. Dysarthria: 0 - normal   11. Extinction and Inattention: 1 - visual, tactile, auditory, spatial or personal inattention or extinction to bilateral simultaneous stimulation in one of the sensory modalities              Total:   2      MRS: 02      LABS:   Reviewed. Lab Results   Component Value Date    HGB 11.2 (L) 2023    WBC 8.7 2023     2023     2023    BUN 12 2023    CREATININE 1.06 02/15/2023    MG 1.9 2023    APTT 62.3 (H) 2023    INR 1.0 2023      No results found for: COVID19    RADIOLOGY:   Images were personally reviewed including:  IR 23:   1. Right ICA supraclinoid segment stenosis just distal to the origin of the right ophthalmic artery with super-imposed thrombosis. Stenosis measuring about 78% by WASID criteria   2.   A 5Fr Sim 2 was used to get access into the right CCA     IR 02/15/2023:   Right ICA supra-clinoid segment just distal to ophthalmic artery origin stenosis measuring about 37% by WASID criteria   Significantly improved severity of stenosis compared to prior DSA  Aborted intracranial stenting since significantly improved stenosis          ASSESSMENT:   76 y/o M with Htn, Right MCA stroke with underlying Right ICA supraclinoid severe stenosis with possible super-imposed thrombus, patient then underwent another diagnostic angiogram with intention to treat the stenosis, which showed significant improvement of the stenosis based on WASID criteria. Stenosis was measuring about 37%. Therefore intracranial stenting was aborted. Patient has been doing well since discharge, no new weakness or numbness. Patient has right wrist swelling and reduced range of motion. Radial pulse intact. PLAN:   --c/w ASA and Brilinta for now   --SBP goal 100-140 mm Hg   --Patient was recommended to follow up with his PCP at an earlier date to evaluate right wrist pain and swelling   --F/up CTA head in 08/2023 to evaluate right ICA stenosis   --F/up with Dr. Moe Lua after CTA head is completed     Case discussed with Dr. Moe Lua attending.     Natasha Bryan MD    Stroke, [facility]  [address] [place]  Electronically signed 2/24/2023 at 2:55 PM

## 2025-02-03 LAB
ANTI DNA DOUBLE STRANDED: 0.7 IU/ML
BASOPHILS ABSOLUTE: 0.02 X10^3UL
BASOPHILS RELATIVE PERCENT: 0.3 %
C-REACTIVE PROTEIN: 16.99 MG/L
ENA ANTIBODIES SCREEN: 0.2 RATIO
EOSINOPHILS ABSOLUTE: 0.07 X10^3UL
EOSINOPHILS RELATIVE PERCENT: 1.1 %
ERYTHROCYTE [DISTWIDTH] IN BLOOD BY AUTOMATED COUNT: 39.9 FL
HCT VFR BLD CALC: 38.9 %
HEMOGLOBIN: 12.9 G/DL
IMMATURE GRANULOCYTES %: 0.5 %
IMMATURE GRANULOCYTES ABSOLUTE: 0.03 X10^3UL
LYMPHOCYTES ABSOLUTE: 1.07 X10^3UL
LYMPHOCYTES RELATIVE PERCENT: 17 %
MCH RBC QN AUTO: 31 PG
MCHC RBC AUTO-ENTMCNC: 33.2 G/DL
MCV RBC AUTO: 93.5 FL
MONOCYTES ABSOLUTE: 0.6 X10^3UL
MONOCYTES RELATIVE PERCENT: 9.5 %
NEUTROPHILS ABSOLUTE: 4.51 X10^3UL
NEUTROPHILS RELATIVE PERCENT: 71.6 %
PLATELET # BLD: 331 X10^3UL
RBC # BLD: 4.16 X10^6UL
REFLEX TO ENA QUANT: NO
RHEUMATOID FACTOR: 11 IU/ML
SED RATE, AUTOMATED: 28 MM/HR
URIC ACID: 9.6 MG/DL
VITAMIN D 25-HYDROXY: 11.9 NG/ML
WBC # BLD: 6.3 X10^3UL